# Patient Record
Sex: MALE | Race: WHITE | NOT HISPANIC OR LATINO
[De-identification: names, ages, dates, MRNs, and addresses within clinical notes are randomized per-mention and may not be internally consistent; named-entity substitution may affect disease eponyms.]

---

## 2017-07-05 ENCOUNTER — NON-APPOINTMENT (OUTPATIENT)
Age: 67
End: 2017-07-05

## 2017-07-05 ENCOUNTER — APPOINTMENT (OUTPATIENT)
Dept: CARDIOLOGY | Facility: CLINIC | Age: 67
End: 2017-07-05

## 2017-07-05 VITALS
DIASTOLIC BLOOD PRESSURE: 100 MMHG | BODY MASS INDEX: 31.42 KG/M2 | OXYGEN SATURATION: 95 % | HEIGHT: 76 IN | HEART RATE: 84 BPM | SYSTOLIC BLOOD PRESSURE: 170 MMHG | WEIGHT: 258 LBS

## 2017-07-05 DIAGNOSIS — L92.0 GRANULOMA ANNULARE: ICD-10-CM

## 2017-07-05 DIAGNOSIS — R63.4 ABNORMAL WEIGHT LOSS: ICD-10-CM

## 2017-07-05 DIAGNOSIS — H50.10 UNSPECIFIED EXOTROPIA: ICD-10-CM

## 2017-07-31 ENCOUNTER — APPOINTMENT (OUTPATIENT)
Dept: CARDIOLOGY | Facility: CLINIC | Age: 67
End: 2017-07-31
Payer: MEDICARE

## 2017-07-31 PROCEDURE — 93306 TTE W/DOPPLER COMPLETE: CPT

## 2017-08-09 ENCOUNTER — APPOINTMENT (OUTPATIENT)
Dept: CARDIOLOGY | Facility: CLINIC | Age: 67
End: 2017-08-09
Payer: MEDICARE

## 2017-08-09 PROCEDURE — 93015 CV STRESS TEST SUPVJ I&R: CPT

## 2017-08-10 ENCOUNTER — APPOINTMENT (OUTPATIENT)
Dept: CARDIOLOGY | Facility: CLINIC | Age: 67
End: 2017-08-10
Payer: MEDICARE

## 2017-08-10 PROCEDURE — 93880 EXTRACRANIAL BILAT STUDY: CPT

## 2017-08-16 ENCOUNTER — APPOINTMENT (OUTPATIENT)
Dept: CARDIOLOGY | Facility: CLINIC | Age: 67
End: 2017-08-16
Payer: MEDICARE

## 2017-08-16 ENCOUNTER — NON-APPOINTMENT (OUTPATIENT)
Age: 67
End: 2017-08-16

## 2017-08-16 VITALS
WEIGHT: 245 LBS | DIASTOLIC BLOOD PRESSURE: 98 MMHG | RESPIRATION RATE: 14 BRPM | TEMPERATURE: 98.2 F | HEART RATE: 40 BPM | BODY MASS INDEX: 29.83 KG/M2 | SYSTOLIC BLOOD PRESSURE: 169 MMHG | OXYGEN SATURATION: 100 % | HEIGHT: 76 IN

## 2017-08-16 PROCEDURE — 99214 OFFICE O/P EST MOD 30 MIN: CPT | Mod: 25

## 2017-08-16 PROCEDURE — 93000 ELECTROCARDIOGRAM COMPLETE: CPT

## 2017-08-22 ENCOUNTER — APPOINTMENT (OUTPATIENT)
Dept: CARDIOLOGY | Facility: CLINIC | Age: 67
End: 2017-08-22
Payer: MEDICARE

## 2017-08-22 PROCEDURE — 93224 XTRNL ECG REC UP TO 48 HRS: CPT

## 2017-08-25 ENCOUNTER — NON-APPOINTMENT (OUTPATIENT)
Age: 67
End: 2017-08-25

## 2017-09-27 ENCOUNTER — APPOINTMENT (OUTPATIENT)
Dept: CARDIOLOGY | Facility: CLINIC | Age: 67
End: 2017-09-27
Payer: MEDICARE

## 2017-09-27 ENCOUNTER — NON-APPOINTMENT (OUTPATIENT)
Age: 67
End: 2017-09-27

## 2017-09-27 VITALS
OXYGEN SATURATION: 96 % | DIASTOLIC BLOOD PRESSURE: 70 MMHG | HEIGHT: 76 IN | WEIGHT: 246 LBS | HEART RATE: 41 BPM | SYSTOLIC BLOOD PRESSURE: 136 MMHG | BODY MASS INDEX: 29.96 KG/M2

## 2017-09-27 PROCEDURE — 93000 ELECTROCARDIOGRAM COMPLETE: CPT

## 2017-09-27 PROCEDURE — 99214 OFFICE O/P EST MOD 30 MIN: CPT | Mod: 25

## 2017-10-12 ENCOUNTER — APPOINTMENT (OUTPATIENT)
Dept: CARDIOLOGY | Facility: CLINIC | Age: 67
End: 2017-10-12

## 2018-05-15 ENCOUNTER — NON-APPOINTMENT (OUTPATIENT)
Age: 68
End: 2018-05-15

## 2018-05-15 ENCOUNTER — APPOINTMENT (OUTPATIENT)
Dept: CARDIOLOGY | Facility: CLINIC | Age: 68
End: 2018-05-15
Payer: MEDICARE

## 2018-05-15 VITALS — BODY MASS INDEX: 31.42 KG/M2 | OXYGEN SATURATION: 99 % | HEIGHT: 76 IN | WEIGHT: 258 LBS | HEART RATE: 69 BPM

## 2018-05-15 DIAGNOSIS — I07.1 RHEUMATIC TRICUSPID INSUFFICIENCY: ICD-10-CM

## 2018-05-15 PROCEDURE — 93000 ELECTROCARDIOGRAM COMPLETE: CPT

## 2018-05-15 PROCEDURE — 99215 OFFICE O/P EST HI 40 MIN: CPT | Mod: 25

## 2018-05-17 LAB
25(OH)D3 SERPL-MCNC: 33.4 NG/ML
ALBUMIN SERPL ELPH-MCNC: 4.1 G/DL
ALP BLD-CCNC: 55 U/L
ALT SERPL-CCNC: 11 U/L
ANION GAP SERPL CALC-SCNC: 14 MMOL/L
AST SERPL-CCNC: 11 U/L
BASOPHILS # BLD AUTO: 0.02 K/UL
BASOPHILS NFR BLD AUTO: 0.3 %
BILIRUB SERPL-MCNC: 0.5 MG/DL
BUN SERPL-MCNC: 16 MG/DL
CALCIUM SERPL-MCNC: 10.2 MG/DL
CHLORIDE SERPL-SCNC: 106 MMOL/L
CHOLEST SERPL-MCNC: 202 MG/DL
CHOLEST/HDLC SERPL: 4.4 RATIO
CO2 SERPL-SCNC: 24 MMOL/L
CREAT SERPL-MCNC: 1.14 MG/DL
EOSINOPHIL # BLD AUTO: 0.12 K/UL
EOSINOPHIL NFR BLD AUTO: 1.7 %
GLUCOSE SERPL-MCNC: 92 MG/DL
HBA1C MFR BLD HPLC: 5.5 %
HCT VFR BLD CALC: 45.4 %
HDLC SERPL-MCNC: 46 MG/DL
HGB BLD-MCNC: 14.9 G/DL
IMM GRANULOCYTES NFR BLD AUTO: 0.4 %
LDLC SERPL CALC-MCNC: 123 MG/DL
LYMPHOCYTES # BLD AUTO: 1.77 K/UL
LYMPHOCYTES NFR BLD AUTO: 25.6 %
MAGNESIUM SERPL-MCNC: 2.2 MG/DL
MAN DIFF?: NORMAL
MCHC RBC-ENTMCNC: 29.7 PG
MCHC RBC-ENTMCNC: 32.8 GM/DL
MCV RBC AUTO: 90.4 FL
MONOCYTES # BLD AUTO: 0.64 K/UL
MONOCYTES NFR BLD AUTO: 9.3 %
NEUTROPHILS # BLD AUTO: 4.33 K/UL
NEUTROPHILS NFR BLD AUTO: 62.7 %
PLATELET # BLD AUTO: 377 K/UL
POTASSIUM SERPL-SCNC: 5.3 MMOL/L
PROT SERPL-MCNC: 6.8 G/DL
RBC # BLD: 5.02 M/UL
RBC # FLD: 13.2 %
SODIUM SERPL-SCNC: 144 MMOL/L
TRIGL SERPL-MCNC: 166 MG/DL
TSH SERPL-ACNC: 1.74 UIU/ML
WBC # FLD AUTO: 6.91 K/UL

## 2018-05-21 ENCOUNTER — MEDICATION RENEWAL (OUTPATIENT)
Age: 68
End: 2018-05-21

## 2018-05-21 ENCOUNTER — APPOINTMENT (OUTPATIENT)
Dept: CARDIOLOGY | Facility: CLINIC | Age: 68
End: 2018-05-21
Payer: MEDICARE

## 2018-05-21 ENCOUNTER — NON-APPOINTMENT (OUTPATIENT)
Age: 68
End: 2018-05-21

## 2018-05-21 VITALS
BODY MASS INDEX: 31.9 KG/M2 | SYSTOLIC BLOOD PRESSURE: 152 MMHG | OXYGEN SATURATION: 98 % | DIASTOLIC BLOOD PRESSURE: 94 MMHG | HEIGHT: 76 IN | WEIGHT: 262 LBS

## 2018-05-21 PROCEDURE — 99215 OFFICE O/P EST HI 40 MIN: CPT | Mod: 25

## 2018-05-21 PROCEDURE — 93000 ELECTROCARDIOGRAM COMPLETE: CPT

## 2018-06-14 ENCOUNTER — APPOINTMENT (OUTPATIENT)
Dept: CARDIOLOGY | Facility: CLINIC | Age: 68
End: 2018-06-14
Payer: MEDICARE

## 2018-06-14 PROCEDURE — 93306 TTE W/DOPPLER COMPLETE: CPT

## 2018-06-27 ENCOUNTER — APPOINTMENT (OUTPATIENT)
Dept: ELECTROPHYSIOLOGY | Facility: CLINIC | Age: 68
End: 2018-06-27
Payer: MEDICARE

## 2018-06-27 VITALS
WEIGHT: 264 LBS | DIASTOLIC BLOOD PRESSURE: 106 MMHG | HEART RATE: 40 BPM | HEIGHT: 77 IN | SYSTOLIC BLOOD PRESSURE: 179 MMHG | BODY MASS INDEX: 31.17 KG/M2

## 2018-06-27 PROCEDURE — 99205 OFFICE O/P NEW HI 60 MIN: CPT

## 2018-06-27 PROCEDURE — 93000 ELECTROCARDIOGRAM COMPLETE: CPT

## 2018-07-18 ENCOUNTER — MEDICATION RENEWAL (OUTPATIENT)
Age: 68
End: 2018-07-18

## 2018-07-31 ENCOUNTER — APPOINTMENT (OUTPATIENT)
Dept: ELECTROPHYSIOLOGY | Facility: CLINIC | Age: 68
End: 2018-07-31

## 2018-08-06 ENCOUNTER — MEDICATION RENEWAL (OUTPATIENT)
Age: 68
End: 2018-08-06

## 2018-08-27 ENCOUNTER — APPOINTMENT (OUTPATIENT)
Dept: ELECTROPHYSIOLOGY | Facility: CLINIC | Age: 68
End: 2018-08-27
Payer: MEDICARE

## 2018-08-27 PROCEDURE — 93228 REMOTE 30 DAY ECG REV/REPORT: CPT

## 2018-09-20 ENCOUNTER — APPOINTMENT (OUTPATIENT)
Dept: ELECTROPHYSIOLOGY | Facility: CLINIC | Age: 68
End: 2018-09-20

## 2018-10-18 ENCOUNTER — APPOINTMENT (OUTPATIENT)
Dept: ELECTROPHYSIOLOGY | Facility: CLINIC | Age: 68
End: 2018-10-18
Payer: MEDICARE

## 2018-10-18 VITALS
HEIGHT: 77 IN | SYSTOLIC BLOOD PRESSURE: 179 MMHG | WEIGHT: 264 LBS | BODY MASS INDEX: 31.17 KG/M2 | DIASTOLIC BLOOD PRESSURE: 126 MMHG

## 2018-10-18 PROCEDURE — 99214 OFFICE O/P EST MOD 30 MIN: CPT

## 2018-10-21 ENCOUNTER — RX RENEWAL (OUTPATIENT)
Age: 68
End: 2018-10-21

## 2019-04-14 ENCOUNTER — RX RENEWAL (OUTPATIENT)
Age: 69
End: 2019-04-14

## 2019-04-16 ENCOUNTER — RX RENEWAL (OUTPATIENT)
Age: 69
End: 2019-04-16

## 2019-05-06 ENCOUNTER — RX RENEWAL (OUTPATIENT)
Age: 69
End: 2019-05-06

## 2019-06-07 ENCOUNTER — NON-APPOINTMENT (OUTPATIENT)
Age: 69
End: 2019-06-07

## 2019-06-07 ENCOUNTER — APPOINTMENT (OUTPATIENT)
Dept: CARDIOLOGY | Facility: CLINIC | Age: 69
End: 2019-06-07
Payer: MEDICARE

## 2019-06-07 VITALS — HEIGHT: 77 IN | OXYGEN SATURATION: 98 % | WEIGHT: 261 LBS | BODY MASS INDEX: 30.82 KG/M2

## 2019-06-07 VITALS — DIASTOLIC BLOOD PRESSURE: 94 MMHG | SYSTOLIC BLOOD PRESSURE: 158 MMHG | HEART RATE: 37 BPM

## 2019-06-07 PROCEDURE — 99215 OFFICE O/P EST HI 40 MIN: CPT | Mod: 25

## 2019-06-07 PROCEDURE — 93000 ELECTROCARDIOGRAM COMPLETE: CPT

## 2019-06-07 NOTE — HISTORY OF PRESENT ILLNESS
[FreeTextEntry1] : A 68-year-old gentleman with  paroxysmal atrial fibrillation. Pt reports weight loss. He has no exertional symptoms. ECG today reveals sinus bradycardia in the 30s.\par Testing and labs were reviewed.

## 2019-06-07 NOTE — ASSESSMENT
[FreeTextEntry1] : 69 yo male with dilated aortic root, PAF.\par He will continue to lose weight, continue meds. He will have a repeat CT angio to eval aorta. Consult with EP for asymptomatic bradycardia. Dr. Bright was called.

## 2019-06-07 NOTE — REASON FOR VISIT
[Consultation] : a consultation regarding [Anticoagulation] : anticoagulation [Atrial Fibrillation] : atrial fibrillation [Sick Sinus Syndrome] : sick sinus syndrome

## 2019-06-07 NOTE — PHYSICAL EXAM
[General Appearance - Well Developed] : well developed [Normal Appearance] : normal appearance [Well Groomed] : well groomed [General Appearance - Well Nourished] : well nourished [No Deformities] : no deformities [General Appearance - In No Acute Distress] : no acute distress [Normal Conjunctiva] : the conjunctiva exhibited no abnormalities [Eyelids - No Xanthelasma] : the eyelids demonstrated no xanthelasmas [Normal Oral Mucosa] : normal oral mucosa [No Oral Pallor] : no oral pallor [Normal Jugular Venous A Waves Present] : normal jugular venous A waves present [No Oral Cyanosis] : no oral cyanosis [Normal Jugular Venous V Waves Present] : normal jugular venous V waves present [No Jugular Venous Smith A Waves] : no jugular venous smith A waves [Respiration, Rhythm And Depth] : normal respiratory rhythm and effort [Exaggerated Use Of Accessory Muscles For Inspiration] : no accessory muscle use [Auscultation Breath Sounds / Voice Sounds] : lungs were clear to auscultation bilaterally [Abdomen Soft] : soft [Abdomen Tenderness] : non-tender [Abdomen Mass (___ Cm)] : no abdominal mass palpated [Abnormal Walk] : normal gait [Gait - Sufficient For Exercise Testing] : the gait was sufficient for exercise testing [Nail Clubbing] : no clubbing of the fingernails [Petechial Hemorrhages (___cm)] : no petechial hemorrhages [Cyanosis, Localized] : no localized cyanosis [Skin Color & Pigmentation] : normal skin color and pigmentation [] : no rash [No Venous Stasis] : no venous stasis [Skin Lesions] : no skin lesions [No Skin Ulcers] : no skin ulcer [No Xanthoma] : no  xanthoma was observed [Oriented To Time, Place, And Person] : oriented to person, place, and time [Affect] : the affect was normal [Mood] : the mood was normal [No Anxiety] : not feeling anxious [5th Left ICS - MCL] : palpated at the 5th LICS in the midclavicular line [Normal] : normal [Bradycardia] : bradycardic [No Precordial Heave] : no precordial heave was noted [Rhythm Regular] : regular [Normal S1] : normal S1 [Normal S2] : normal S2 [No Murmur] : no murmurs heard [II] : a grade 2 [No Pitting Edema] : no pitting edema present

## 2019-06-21 RX ORDER — VALSARTAN 160 MG/1
160 TABLET, COATED ORAL DAILY
Qty: 30 | Refills: 0 | Status: DISCONTINUED | COMMUNITY
Start: 2017-08-16 | End: 2019-06-21

## 2019-06-24 ENCOUNTER — MEDICATION RENEWAL (OUTPATIENT)
Age: 69
End: 2019-06-24

## 2019-07-12 ENCOUNTER — APPOINTMENT (OUTPATIENT)
Dept: CARDIOLOGY | Facility: CLINIC | Age: 69
End: 2019-07-12
Payer: MEDICARE

## 2019-07-12 VITALS
DIASTOLIC BLOOD PRESSURE: 82 MMHG | BODY MASS INDEX: 30.34 KG/M2 | WEIGHT: 257 LBS | OXYGEN SATURATION: 97 % | HEART RATE: 45 BPM | HEIGHT: 77 IN | SYSTOLIC BLOOD PRESSURE: 127 MMHG

## 2019-07-12 PROCEDURE — 99214 OFFICE O/P EST MOD 30 MIN: CPT

## 2019-07-12 NOTE — PHYSICAL EXAM
[General Appearance - Well Developed] : well developed [Normal Appearance] : normal appearance [Well Groomed] : well groomed [General Appearance - Well Nourished] : well nourished [No Deformities] : no deformities [General Appearance - In No Acute Distress] : no acute distress [Normal Conjunctiva] : the conjunctiva exhibited no abnormalities [Eyelids - No Xanthelasma] : the eyelids demonstrated no xanthelasmas [Normal Oral Mucosa] : normal oral mucosa [No Oral Pallor] : no oral pallor [No Oral Cyanosis] : no oral cyanosis [Normal Jugular Venous A Waves Present] : normal jugular venous A waves present [Normal Jugular Venous V Waves Present] : normal jugular venous V waves present [No Jugular Venous Smith A Waves] : no jugular venous smith A waves [Respiration, Rhythm And Depth] : normal respiratory rhythm and effort [Exaggerated Use Of Accessory Muscles For Inspiration] : no accessory muscle use [Auscultation Breath Sounds / Voice Sounds] : lungs were clear to auscultation bilaterally [Abdomen Soft] : soft [Abdomen Tenderness] : non-tender [Abdomen Mass (___ Cm)] : no abdominal mass palpated [Abnormal Walk] : normal gait [Gait - Sufficient For Exercise Testing] : the gait was sufficient for exercise testing [Nail Clubbing] : no clubbing of the fingernails [Cyanosis, Localized] : no localized cyanosis [Petechial Hemorrhages (___cm)] : no petechial hemorrhages [Skin Color & Pigmentation] : normal skin color and pigmentation [] : no rash [No Venous Stasis] : no venous stasis [Skin Lesions] : no skin lesions [No Xanthoma] : no  xanthoma was observed [No Skin Ulcers] : no skin ulcer [Oriented To Time, Place, And Person] : oriented to person, place, and time [Affect] : the affect was normal [No Anxiety] : not feeling anxious [Mood] : the mood was normal [5th Left ICS - MCL] : palpated at the 5th LICS in the midclavicular line [Normal] : normal [No Precordial Heave] : no precordial heave was noted [Bradycardia] : bradycardic [Rhythm Regular] : regular [Normal S1] : normal S1 [Normal S2] : normal S2 [No Murmur] : no murmurs heard [II] : a grade 2 [No Pitting Edema] : no pitting edema present

## 2019-07-12 NOTE — DISCUSSION/SUMMARY
[FreeTextEntry1] : HTN; Currently stable, continue current medication regimen.\par AO dilation: Recent CT results reviewed. \par Afib : rate controlled, continue eliquis.\par OV in 2 months \par

## 2019-07-12 NOTE — HISTORY OF PRESENT ILLNESS
[FreeTextEntry1] : A 68-year-old gentleman with  paroxysmal atrial fibrillation, ascending aorta dilatation  presents to office as blood pressure medication was recently changed from valsartan to telmisartan. blood pressure currently stable.\par \par Denies chest pain, shortness of breath, dyspnea on exertion, palpitations, orthopnea, paroxysmal nocturnal dyspnea, claudication, dizziness, lightheadedness, presyncopal or syncopal symptoms. \par

## 2019-07-17 ENCOUNTER — RX RENEWAL (OUTPATIENT)
Age: 69
End: 2019-07-17

## 2019-07-18 ENCOUNTER — MEDICATION RENEWAL (OUTPATIENT)
Age: 69
End: 2019-07-18

## 2019-08-12 ENCOUNTER — NON-APPOINTMENT (OUTPATIENT)
Age: 69
End: 2019-08-12

## 2019-08-12 ENCOUNTER — APPOINTMENT (OUTPATIENT)
Dept: CARDIOLOGY | Facility: CLINIC | Age: 69
End: 2019-08-12
Payer: MEDICARE

## 2019-08-12 VITALS — OXYGEN SATURATION: 100 % | HEART RATE: 44 BPM | SYSTOLIC BLOOD PRESSURE: 166 MMHG | DIASTOLIC BLOOD PRESSURE: 80 MMHG

## 2019-08-12 VITALS — BODY MASS INDEX: 31.05 KG/M2 | WEIGHT: 263 LBS | HEIGHT: 77 IN

## 2019-08-12 PROCEDURE — 93000 ELECTROCARDIOGRAM COMPLETE: CPT

## 2019-08-12 PROCEDURE — 99214 OFFICE O/P EST MOD 30 MIN: CPT | Mod: 25

## 2019-08-12 NOTE — HISTORY OF PRESENT ILLNESS
[FreeTextEntry1] : A 69-year-old gentleman with  paroxysmal atrial fibrillation, ascending aorta dilatation  presents to office as blood pressure medication was recently changed from valsartan to telmisartan. blood pressure currently stable.\par \par Pt is tolerating the medications. ECG was noted to be bradycardic. This is similar to prior tracings and the patient denies symptoms of dizziness, fatigue or near syncope.

## 2019-08-12 NOTE — DISCUSSION/SUMMARY
[FreeTextEntry1] : HTN; Currently stable, continue current medication regimen.\par Continue anticoagulation\par

## 2019-08-12 NOTE — PHYSICAL EXAM
[General Appearance - Well Developed] : well developed [General Appearance - Well Nourished] : well nourished [Normal Appearance] : normal appearance [Well Groomed] : well groomed [General Appearance - In No Acute Distress] : no acute distress [No Deformities] : no deformities [Normal Conjunctiva] : the conjunctiva exhibited no abnormalities [Normal Oral Mucosa] : normal oral mucosa [Eyelids - No Xanthelasma] : the eyelids demonstrated no xanthelasmas [No Oral Pallor] : no oral pallor [No Oral Cyanosis] : no oral cyanosis [Normal Jugular Venous A Waves Present] : normal jugular venous A waves present [Normal Jugular Venous V Waves Present] : normal jugular venous V waves present [No Jugular Venous Smith A Waves] : no jugular venous smith A waves [Respiration, Rhythm And Depth] : normal respiratory rhythm and effort [Auscultation Breath Sounds / Voice Sounds] : lungs were clear to auscultation bilaterally [Abdomen Soft] : soft [Exaggerated Use Of Accessory Muscles For Inspiration] : no accessory muscle use [Abdomen Tenderness] : non-tender [Abdomen Mass (___ Cm)] : no abdominal mass palpated [Abnormal Walk] : normal gait [Gait - Sufficient For Exercise Testing] : the gait was sufficient for exercise testing [Cyanosis, Localized] : no localized cyanosis [Nail Clubbing] : no clubbing of the fingernails [Petechial Hemorrhages (___cm)] : no petechial hemorrhages [Skin Color & Pigmentation] : normal skin color and pigmentation [] : no rash [No Venous Stasis] : no venous stasis [Skin Lesions] : no skin lesions [No Skin Ulcers] : no skin ulcer [No Xanthoma] : no  xanthoma was observed [Oriented To Time, Place, And Person] : oriented to person, place, and time [Affect] : the affect was normal [Mood] : the mood was normal [No Anxiety] : not feeling anxious [5th Left ICS - MCL] : palpated at the 5th LICS in the midclavicular line [Normal] : normal [No Precordial Heave] : no precordial heave was noted [Bradycardia] : bradycardic [Rhythm Regular] : regular [Normal S1] : normal S1 [No Murmur] : no murmurs heard [Normal S2] : normal S2 [II] : a grade 2 [No Pitting Edema] : no pitting edema present

## 2019-09-11 ENCOUNTER — RX RENEWAL (OUTPATIENT)
Age: 69
End: 2019-09-11

## 2019-09-11 ENCOUNTER — MEDICATION RENEWAL (OUTPATIENT)
Age: 69
End: 2019-09-11

## 2020-09-10 ENCOUNTER — RX RENEWAL (OUTPATIENT)
Age: 70
End: 2020-09-10

## 2020-10-05 ENCOUNTER — NON-APPOINTMENT (OUTPATIENT)
Age: 70
End: 2020-10-05

## 2020-10-05 ENCOUNTER — APPOINTMENT (OUTPATIENT)
Dept: CARDIOLOGY | Facility: CLINIC | Age: 70
End: 2020-10-05
Payer: MEDICARE

## 2020-10-05 VITALS
OXYGEN SATURATION: 99 % | WEIGHT: 268 LBS | HEART RATE: 74 BPM | SYSTOLIC BLOOD PRESSURE: 163 MMHG | HEIGHT: 77 IN | DIASTOLIC BLOOD PRESSURE: 90 MMHG | BODY MASS INDEX: 31.64 KG/M2

## 2020-10-05 DIAGNOSIS — N28.1 CYST OF KIDNEY, ACQUIRED: ICD-10-CM

## 2020-10-05 PROCEDURE — 99214 OFFICE O/P EST MOD 30 MIN: CPT

## 2020-10-05 PROCEDURE — 93000 ELECTROCARDIOGRAM COMPLETE: CPT

## 2020-10-05 NOTE — PHYSICAL EXAM
[General Appearance - Well Developed] : well developed [Normal Appearance] : normal appearance [Well Groomed] : well groomed [General Appearance - Well Nourished] : well nourished [No Deformities] : no deformities [General Appearance - In No Acute Distress] : no acute distress [Normal Conjunctiva] : the conjunctiva exhibited no abnormalities [Eyelids - No Xanthelasma] : the eyelids demonstrated no xanthelasmas [Normal Oral Mucosa] : normal oral mucosa [No Oral Pallor] : no oral pallor [No Oral Cyanosis] : no oral cyanosis [Normal Jugular Venous A Waves Present] : normal jugular venous A waves present [Normal Jugular Venous V Waves Present] : normal jugular venous V waves present [No Jugular Venous Smith A Waves] : no jugular venous smith A waves [Respiration, Rhythm And Depth] : normal respiratory rhythm and effort [Exaggerated Use Of Accessory Muscles For Inspiration] : no accessory muscle use [Auscultation Breath Sounds / Voice Sounds] : lungs were clear to auscultation bilaterally [Abdomen Soft] : soft [Abdomen Tenderness] : non-tender [Abdomen Mass (___ Cm)] : no abdominal mass palpated [Abnormal Walk] : normal gait [Gait - Sufficient For Exercise Testing] : the gait was sufficient for exercise testing [Nail Clubbing] : no clubbing of the fingernails [Cyanosis, Localized] : no localized cyanosis [Petechial Hemorrhages (___cm)] : no petechial hemorrhages [Skin Color & Pigmentation] : normal skin color and pigmentation [] : no rash [No Venous Stasis] : no venous stasis [Skin Lesions] : no skin lesions [No Skin Ulcers] : no skin ulcer [No Xanthoma] : no  xanthoma was observed [Oriented To Time, Place, And Person] : oriented to person, place, and time [Affect] : the affect was normal [Mood] : the mood was normal [No Anxiety] : not feeling anxious [5th Left ICS - MCL] : palpated at the 5th LICS in the midclavicular line [Normal] : normal [No Precordial Heave] : no precordial heave was noted [Bradycardia] : bradycardic [Rhythm Regular] : regular [Normal S1] : normal S1 [Normal S2] : normal S2 [No Murmur] : no murmurs heard [II] : a grade 2 [No Pitting Edema] : no pitting edema present

## 2020-10-05 NOTE — DISCUSSION/SUMMARY
[FreeTextEntry1] : Pt will continue B/P measurements at home for correlation. Pt's wife (an RN) will monitor. Pt will return in 3 months for evaluation. Will repeat Echo to evaluate Pulmonary HTN.

## 2020-10-05 NOTE — HISTORY OF PRESENT ILLNESS
[FreeTextEntry1] : A 70-year-old gentleman with  paroxysmal atrial fibrillation, ascending aorta dilatation. Pt has elevated blood pressure but he and his wife report lower blood pressure at home. \par \par Pt is tolerating the medications. ECG was reviewed. Pt denies chest pain or shortness of breath.

## 2020-10-26 ENCOUNTER — APPOINTMENT (OUTPATIENT)
Dept: CARDIOLOGY | Facility: CLINIC | Age: 70
End: 2020-10-26

## 2020-11-02 PROBLEM — N28.1 RENAL CYST: Status: ACTIVE | Noted: 2017-11-03

## 2020-11-07 ENCOUNTER — APPOINTMENT (OUTPATIENT)
Dept: CARDIOLOGY | Facility: CLINIC | Age: 70
End: 2020-11-07
Payer: MEDICARE

## 2020-11-07 PROCEDURE — 93306 TTE W/DOPPLER COMPLETE: CPT

## 2020-11-10 ENCOUNTER — APPOINTMENT (OUTPATIENT)
Dept: UROLOGY | Facility: CLINIC | Age: 70
End: 2020-11-10
Payer: MEDICARE

## 2020-11-10 VITALS
WEIGHT: 255 LBS | BODY MASS INDEX: 32.73 KG/M2 | DIASTOLIC BLOOD PRESSURE: 97 MMHG | HEIGHT: 74 IN | OXYGEN SATURATION: 98 % | TEMPERATURE: 97.7 F | HEART RATE: 70 BPM | SYSTOLIC BLOOD PRESSURE: 127 MMHG

## 2020-11-10 DIAGNOSIS — Z86.79 PERSONAL HISTORY OF OTHER DISEASES OF THE CIRCULATORY SYSTEM: ICD-10-CM

## 2020-11-10 PROCEDURE — 99213 OFFICE O/P EST LOW 20 MIN: CPT

## 2020-11-10 NOTE — REVIEW OF SYSTEMS
[Constipation] : constipation [see HPI] : see HPI [Wake up at night to urinate  How many times?  ___] : wakes up to urinate [unfilled] times during the night [Slow urine stream] : slow urine stream [Negative] : Heme/Lymph

## 2020-11-11 NOTE — HISTORY OF PRESENT ILLNESS
[FreeTextEntry1] : 70 year old man seen 11/10/2020  with complaint of elevated PSA. This began on 10/2/20 where it was found to be 4.99 and the PSA was 3.67 in June 2019. He denies any lower urinary tract symptoms.\par It is not associated with LUTS.\par \par No hematuria, no dysuria, no frequency, no urgency, no hesitancy, no straining. No incontinence. \par No fevers, no chills, no nausea, no vomiting, no flank pain.\par

## 2020-11-11 NOTE — ASSESSMENT
[FreeTextEntry1] : 71 yo male with elevated PSA of 4.99 on 10/2/20. Discussed with patient that PSA is imprecise test. PSA can be elevated due to benign prostate enlargement, prostate stimulation, sexual activity, urinary tract infection, prostatitis, as well as prostate cancer. There is no value for PSA which is diagnostic for prostate cancer, nor is there value which conclusively excludes prostate cancer. PSA simply stratifies risk for prostate cancer and diagnosis of prostate cancer requires prostate biopsy. Patient would like to repeat PSA in 3 months and based on the results decide if he would like to proceed with a biopsy.\par - PSA and 4K score in 3 months\par - RTO in 3 months to discuss results\par

## 2020-12-28 ENCOUNTER — RX RENEWAL (OUTPATIENT)
Age: 70
End: 2020-12-28

## 2021-05-18 ENCOUNTER — NON-APPOINTMENT (OUTPATIENT)
Age: 71
End: 2021-05-18

## 2021-05-18 ENCOUNTER — APPOINTMENT (OUTPATIENT)
Dept: FAMILY MEDICINE | Facility: CLINIC | Age: 71
End: 2021-05-18
Payer: MEDICARE

## 2021-05-18 VITALS
SYSTOLIC BLOOD PRESSURE: 140 MMHG | BODY MASS INDEX: 32.73 KG/M2 | TEMPERATURE: 96.3 F | HEART RATE: 75 BPM | DIASTOLIC BLOOD PRESSURE: 88 MMHG | OXYGEN SATURATION: 96 % | WEIGHT: 255 LBS | HEIGHT: 74 IN

## 2021-05-18 VITALS — DIASTOLIC BLOOD PRESSURE: 82 MMHG | SYSTOLIC BLOOD PRESSURE: 130 MMHG

## 2021-05-18 DIAGNOSIS — F17.290 NICOTINE DEPENDENCE, OTHER TOBACCO PRODUCT, UNCOMPLICATED: ICD-10-CM

## 2021-05-18 DIAGNOSIS — Z87.898 PERSONAL HISTORY OF OTHER SPECIFIED CONDITIONS: ICD-10-CM

## 2021-05-18 DIAGNOSIS — R73.01 IMPAIRED FASTING GLUCOSE: ICD-10-CM

## 2021-05-18 DIAGNOSIS — Z23 ENCOUNTER FOR IMMUNIZATION: ICD-10-CM

## 2021-05-18 PROCEDURE — 90750 HZV VACC RECOMBINANT IM: CPT | Mod: GY

## 2021-05-18 PROCEDURE — 99214 OFFICE O/P EST MOD 30 MIN: CPT | Mod: 25

## 2021-05-18 PROCEDURE — 90471 IMMUNIZATION ADMIN: CPT

## 2021-05-18 PROCEDURE — 36415 COLL VENOUS BLD VENIPUNCTURE: CPT

## 2021-05-18 RX ORDER — MIRTAZAPINE 15 MG/1
15 TABLET, FILM COATED ORAL
Qty: 30 | Refills: 0 | Status: DISCONTINUED | COMMUNITY
Start: 2020-08-10 | End: 2021-05-18

## 2021-05-18 RX ORDER — VALSARTAN 160 MG/1
160 TABLET, COATED ORAL DAILY
Refills: 0 | Status: DISCONTINUED | COMMUNITY
End: 2021-05-18

## 2021-05-18 RX ORDER — LAMOTRIGINE 25 MG/1
25 TABLET ORAL
Qty: 120 | Refills: 0 | Status: DISCONTINUED | COMMUNITY
Start: 2020-06-25 | End: 2021-05-18

## 2021-05-18 NOTE — REVIEW OF SYSTEMS
[Fatigue] : fatigue [Constipation] : constipation [Frequency] : frequency [Poor Libido] : poor libido [Chest Pain] : no chest pain [Palpitations] : no palpitations [Shortness Of Breath] : no shortness of breath [Cough] : no cough [Dyspnea on Exertion] : not dyspnea on exertion [Abdominal Pain] : no abdominal pain [Nausea] : no nausea [Diarrhea] : no diarrhea [Vomiting] : no vomiting [Dysuria] : no dysuria [Hematuria] : no hematuria [Joint Pain] : no joint pain [Back Pain] : no back pain [Skin Rash] : no skin rash [Headache] : no headache [Dizziness] : no dizziness [Memory Loss] : no memory loss [Anxiety] : no anxiety [Depression] : no depression [FreeTextEntry3] : exotropia [de-identified] : history of absence seizures

## 2021-05-18 NOTE — ASSESSMENT
[FreeTextEntry1] : His main complaint is chronic fatigue. His wife feels that this is due in part to Keppra and plans to talk to his neurologist about other treatment options for his seizures.

## 2021-05-18 NOTE — PLAN
[FreeTextEntry1] : Check labs as above. He is due for a follow up visit with Dr. Hendricks and Dr. Lorenzana. Will make sure that these labs are shared with both of them. He will continue all of his current medications at this time but will discuss other possible treatment options for his seizures with neurology.

## 2021-05-18 NOTE — PHYSICAL EXAM
[No Carotid Bruits] : no carotid bruits [No Edema] : there was no peripheral edema [Soft] : abdomen soft [Non Tender] : non-tender [Grossly Normal Strength/Tone] : grossly normal strength/tone [No Rash] : no rash [No Focal Deficits] : no focal deficits [Normal] : affect was normal and insight and judgment were intact [de-identified] : right exotropia

## 2021-05-18 NOTE — HISTORY OF PRESENT ILLNESS
[Spouse] : spouse [FreeTextEntry1] : RC MCCAINLBAUM is a 70 year old male here for a follow up visit.\par  [de-identified] : He is here for blood work to follow up his cholesterol. Dr. Hendricks requested blood work prior to his visit. He has also had an elevated PSA and has seen Dr. Lorenzana for this. His last visit with both Dr. Hendricks and Dr. Lorenzana was in Fall 2020.\par \par He reports fatigue and leg weakness. His wife feels that his is due to his medication (Keppra). He also has irregular eating habits and she feels that he gets hypoglycemic from eating too many carbohydrates.\par \par He would like to get the Shingrix vaccine today. He had the COVID vaccine and his second dose was in March.

## 2021-05-22 LAB
25(OH)D3 SERPL-MCNC: 38.2 NG/ML
ALBUMIN SERPL ELPH-MCNC: 4.4 G/DL
ALP BLD-CCNC: 67 U/L
ALT SERPL-CCNC: 6 U/L
ANION GAP SERPL CALC-SCNC: 13 MMOL/L
AST SERPL-CCNC: 9 U/L
BASOPHILS # BLD AUTO: 0.05 K/UL
BASOPHILS NFR BLD AUTO: 0.8 %
BILIRUB SERPL-MCNC: 0.8 MG/DL
BUN SERPL-MCNC: 16 MG/DL
CALCIUM SERPL-MCNC: 9.7 MG/DL
CHLORIDE SERPL-SCNC: 106 MMOL/L
CHOLEST SERPL-MCNC: 197 MG/DL
CK SERPL-CCNC: 74 U/L
CO2 SERPL-SCNC: 22 MMOL/L
CREAT SERPL-MCNC: 1.05 MG/DL
EOSINOPHIL # BLD AUTO: 0.18 K/UL
EOSINOPHIL NFR BLD AUTO: 2.8 %
GLUCOSE SERPL-MCNC: 85 MG/DL
HCT VFR BLD CALC: 45.5 %
HDLC SERPL-MCNC: 39 MG/DL
HGB BLD-MCNC: 14.8 G/DL
IMM GRANULOCYTES NFR BLD AUTO: 0.3 %
LDLC SERPL CALC-MCNC: 141 MG/DL
LYMPHOCYTES # BLD AUTO: 1.51 K/UL
LYMPHOCYTES NFR BLD AUTO: 23.7 %
MAN DIFF?: NORMAL
MCHC RBC-ENTMCNC: 30.4 PG
MCHC RBC-ENTMCNC: 32.5 GM/DL
MCV RBC AUTO: 93.4 FL
MONOCYTES # BLD AUTO: 0.57 K/UL
MONOCYTES NFR BLD AUTO: 8.9 %
NEUTROPHILS # BLD AUTO: 4.05 K/UL
NEUTROPHILS NFR BLD AUTO: 63.5 %
NONHDLC SERPL-MCNC: 158 MG/DL
PLATELET # BLD AUTO: 287 K/UL
POTASSIUM SERPL-SCNC: 4.2 MMOL/L
PROT SERPL-MCNC: 6.6 G/DL
PSA SERPL-MCNC: 5.21 NG/ML
RBC # BLD: 4.87 M/UL
RBC # FLD: 13.7 %
SODIUM SERPL-SCNC: 141 MMOL/L
TRIGL SERPL-MCNC: 82 MG/DL
TSH SERPL-ACNC: 1.9 UIU/ML
WBC # FLD AUTO: 6.38 K/UL

## 2021-06-01 DIAGNOSIS — M79.603 PAIN IN ARM, UNSPECIFIED: ICD-10-CM

## 2021-06-02 RX ORDER — NAPROXEN 500 MG/1
500 TABLET ORAL
Qty: 60 | Refills: 0 | Status: DISCONTINUED | COMMUNITY
Start: 2021-06-01 | End: 2021-06-02

## 2021-06-10 ENCOUNTER — APPOINTMENT (OUTPATIENT)
Dept: UROLOGY | Facility: CLINIC | Age: 71
End: 2021-06-10
Payer: MEDICARE

## 2021-06-10 VITALS
DIASTOLIC BLOOD PRESSURE: 84 MMHG | HEART RATE: 74 BPM | OXYGEN SATURATION: 98 % | TEMPERATURE: 97.2 F | SYSTOLIC BLOOD PRESSURE: 123 MMHG

## 2021-06-10 DIAGNOSIS — R97.20 ELEVATED PROSTATE, SPECIFIC ANTIGEN [PSA]: ICD-10-CM

## 2021-06-10 PROCEDURE — 99213 OFFICE O/P EST LOW 20 MIN: CPT

## 2021-06-16 ENCOUNTER — APPOINTMENT (OUTPATIENT)
Dept: FAMILY MEDICINE | Facility: CLINIC | Age: 71
End: 2021-06-16

## 2021-06-24 ENCOUNTER — APPOINTMENT (OUTPATIENT)
Dept: MRI IMAGING | Facility: CLINIC | Age: 71
End: 2021-06-24
Payer: MEDICARE

## 2021-06-24 ENCOUNTER — OUTPATIENT (OUTPATIENT)
Dept: OUTPATIENT SERVICES | Facility: HOSPITAL | Age: 71
LOS: 1 days | End: 2021-06-24
Payer: MEDICARE

## 2021-06-24 ENCOUNTER — RESULT REVIEW (OUTPATIENT)
Age: 71
End: 2021-06-24

## 2021-06-24 DIAGNOSIS — Z00.8 ENCOUNTER FOR OTHER GENERAL EXAMINATION: ICD-10-CM

## 2021-06-24 PROCEDURE — 76377 3D RENDER W/INTRP POSTPROCES: CPT

## 2021-06-24 PROCEDURE — A9585: CPT

## 2021-06-24 PROCEDURE — 72197 MRI PELVIS W/O & W/DYE: CPT

## 2021-06-24 PROCEDURE — 72197 MRI PELVIS W/O & W/DYE: CPT | Mod: 26,MH

## 2021-06-24 PROCEDURE — 76377 3D RENDER W/INTRP POSTPROCES: CPT | Mod: 26

## 2021-06-26 ENCOUNTER — RX RENEWAL (OUTPATIENT)
Age: 71
End: 2021-06-26

## 2021-06-28 ENCOUNTER — APPOINTMENT (OUTPATIENT)
Dept: CARDIOLOGY | Facility: CLINIC | Age: 71
End: 2021-06-28
Payer: MEDICARE

## 2021-06-28 ENCOUNTER — NON-APPOINTMENT (OUTPATIENT)
Age: 71
End: 2021-06-28

## 2021-06-28 VITALS
HEIGHT: 74 IN | WEIGHT: 255 LBS | HEART RATE: 77 BPM | DIASTOLIC BLOOD PRESSURE: 90 MMHG | SYSTOLIC BLOOD PRESSURE: 130 MMHG | BODY MASS INDEX: 32.73 KG/M2 | OXYGEN SATURATION: 96 %

## 2021-06-28 DIAGNOSIS — R00.1 BRADYCARDIA, UNSPECIFIED: ICD-10-CM

## 2021-06-28 PROCEDURE — 93000 ELECTROCARDIOGRAM COMPLETE: CPT

## 2021-06-28 PROCEDURE — 99214 OFFICE O/P EST MOD 30 MIN: CPT

## 2021-06-28 NOTE — DISCUSSION/SUMMARY
[FreeTextEntry1] : Pt will continue B/P measurements at home for correlation. Pt's wife (an RN) will monitor. Pt will return in 3 months for evaluation. Will repeat Echo to evaluate Pulmonary HTN. Pt will start Crestor 10 mg daily. Will repeat lipids and LFTs in 9 weeks.

## 2021-06-28 NOTE — REASON FOR VISIT
[Arrhythmia/ECG Abnorrmalities] : arrhythmia/ECG abnormalities [Consultation] : a consultation regarding [Anticoagulation] : anticoagulation [Atrial Fibrillation] : atrial fibrillation [Sick Sinus Syndrome] : sick sinus syndrome

## 2021-06-28 NOTE — HISTORY OF PRESENT ILLNESS
[FreeTextEntry1] : A 70-year-old gentleman with  paroxysmal atrial fibrillation, ascending aorta dilatation. Blood pressure improved today. Recent notes from PMD and URO were reviewed. Pt denies exertional symptoms. Pt has reduced Keppra dose and feels improved. Labs were reviewed.

## 2021-06-30 ENCOUNTER — NON-APPOINTMENT (OUTPATIENT)
Age: 71
End: 2021-06-30

## 2021-07-01 ENCOUNTER — APPOINTMENT (OUTPATIENT)
Dept: UROLOGY | Facility: CLINIC | Age: 71
End: 2021-07-01
Payer: MEDICARE

## 2021-07-01 VITALS
SYSTOLIC BLOOD PRESSURE: 183 MMHG | WEIGHT: 165 LBS | OXYGEN SATURATION: 96 % | BODY MASS INDEX: 23.1 KG/M2 | HEART RATE: 57 BPM | DIASTOLIC BLOOD PRESSURE: 89 MMHG | HEIGHT: 71 IN

## 2021-07-01 PROCEDURE — 99213 OFFICE O/P EST LOW 20 MIN: CPT

## 2021-07-01 NOTE — ASSESSMENT
[FreeTextEntry1] : 69 yo male with elevated PSA and PIRADS 5 lesion. Recommend fusion biopsy. Will set up with Dr Jaime.

## 2021-07-01 NOTE — HISTORY OF PRESENT ILLNESS
[FreeTextEntry1] : 70 year old man seen 11/10/2020  with complaint of elevated PSA. This began on 10/2/20 where it was found to be 4.99 and the PSA was 3.67 in June 2019. He denies any lower urinary tract symptoms.\par It is not associated with LUTS.\par \par No hematuria, no dysuria, no frequency, no urgency, no hesitancy, no straining. No incontinence. \par No fevers, no chills, no nausea, no vomiting, no flank pain.\par \par 07/01/2021: Patient presents for follow up. MRI showed PIRADS 5 lesion in his prostate. He denies new  symptoms or complaints.

## 2021-07-06 ENCOUNTER — NON-APPOINTMENT (OUTPATIENT)
Age: 71
End: 2021-07-06

## 2021-07-07 ENCOUNTER — NON-APPOINTMENT (OUTPATIENT)
Age: 71
End: 2021-07-07

## 2021-07-11 PROBLEM — R97.20 ELEVATED PSA: Status: ACTIVE | Noted: 2020-11-10

## 2021-07-11 NOTE — HISTORY OF PRESENT ILLNESS
[FreeTextEntry1] : 70 year old man seen 11/10/2020  with complaint of elevated PSA. This began on 10/2/20 where it was found to be 4.99 and the PSA was 3.67 in June 2019. He denies any lower urinary tract symptoms.\par It is not associated with LUTS.  No hematuria, no dysuria, no frequency, no urgency, no hesitancy, no straining. No incontinence. No fevers, no chills, no nausea, no vomiting, no flank pain.\par \par \par 06/10/2021: Patient presents for follow up. He repeated his PSA and is here to discuss. No new  complaints.\par \par PSA (%FREE) TREND \par 5.21   06/2021\par 4.99   10/2020\par 3.67   06/2019

## 2021-07-11 NOTE — ASSESSMENT
[FreeTextEntry1] : 69 yo male with elevated PSA of 5.21 from 4.99 on 10/2/20. Discussed with patient that PSA is imprecise test. PSA can be elevated due to benign prostate enlargement, prostate stimulation, sexual activity, urinary tract infection, prostatitis, as well as prostate cancer. There is no value for PSA which is diagnostic for prostate cancer, nor is there value which conclusively excludes prostate cancer. PSA simply stratifies risk for prostate cancer and diagnosis of prostate cancer requires prostate biopsy. Recommended prostate MRI\par \par Elevated PSA\par - prostate MRI\par

## 2021-07-19 ENCOUNTER — NON-APPOINTMENT (OUTPATIENT)
Age: 71
End: 2021-07-19

## 2021-07-20 ENCOUNTER — NON-APPOINTMENT (OUTPATIENT)
Age: 71
End: 2021-07-20

## 2021-07-27 ENCOUNTER — NON-APPOINTMENT (OUTPATIENT)
Age: 71
End: 2021-07-27

## 2021-07-28 ENCOUNTER — APPOINTMENT (OUTPATIENT)
Dept: UROLOGY | Facility: CLINIC | Age: 71
End: 2021-07-28
Payer: MEDICARE

## 2021-07-28 PROCEDURE — 76377 3D RENDER W/INTRP POSTPROCES: CPT

## 2021-07-28 PROCEDURE — 76942 ECHO GUIDE FOR BIOPSY: CPT | Mod: 59

## 2021-07-28 PROCEDURE — 76872 US TRANSRECTAL: CPT

## 2021-07-28 PROCEDURE — 55700: CPT | Mod: 22

## 2021-08-04 LAB — CORE LAB BIOPSY: NORMAL

## 2021-08-09 ENCOUNTER — APPOINTMENT (OUTPATIENT)
Dept: UROLOGY | Facility: CLINIC | Age: 71
End: 2021-08-09

## 2021-08-10 ENCOUNTER — APPOINTMENT (OUTPATIENT)
Dept: UROLOGY | Facility: CLINIC | Age: 71
End: 2021-08-10
Payer: MEDICARE

## 2021-08-10 ENCOUNTER — RESULT REVIEW (OUTPATIENT)
Age: 71
End: 2021-08-10

## 2021-08-10 PROCEDURE — 99213 OFFICE O/P EST LOW 20 MIN: CPT

## 2021-08-10 NOTE — HISTORY OF PRESENT ILLNESS
[FreeTextEntry1] : 70 year old man seen 11/10/2020  with complaint of elevated PSA. This began on 10/2/20 where it was found to be 4.99 and the PSA was 3.67 in June 2019. He denies any lower urinary tract symptoms.\par It is not associated with LUTS.\par \par No hematuria, no dysuria, no frequency, no urgency, no hesitancy, no straining. No incontinence. \par No fevers, no chills, no nausea, no vomiting, no flank pain.\par \par 07/01/2021: Patient presents for follow up. MRI showed PIRADS 5 lesion in his prostate. He denies new  symptoms or complaints.\par \par 08/10/2021: Patient presents for follow up. He is s/p transperineal fusion biopsy. This showed GS 3+4=7 in 2 cores and GS 3+3=6 in 2 cores.

## 2021-08-10 NOTE — ASSESSMENT
[FreeTextEntry1] : 72 yo male with cT1c, PSA 5.21, GS 3+4=7 prostate cancer. We had an extensive discussion about the treatment options.  We discussed robot-assisted radical prostatectomy, with benefits of resection of entire prostate gland and subsequent pathologic examination, removal of lymph nodes for prognostic and therapeutic purposes, decreased complications developing anew years from now relative to radiation therapy. It has the risks associated with anesthesia, bleeding, infection, lymphocele, erectile dysfunction, urinary incontinence, and injury to adjacent organs including the rectum and bladder. We discussed radiation therapy, including EBRT and brachytherapy, with the benefits of noninvasiveness, no anesthesia, no hospital stay, and minimal recovery compared to surgery. The risks including delayed complications of radiation proctitis and radiation cystitis were discussed. It was stressed that both modalities have comparable cancer control and disease specific survival. Patient was given recommendations of urologic oncologist and radiation oncologist to discuss these modalities further.

## 2021-08-16 ENCOUNTER — OUTPATIENT (OUTPATIENT)
Dept: OUTPATIENT SERVICES | Facility: HOSPITAL | Age: 71
LOS: 1 days | End: 2021-08-16
Payer: MEDICARE

## 2021-08-16 ENCOUNTER — APPOINTMENT (OUTPATIENT)
Dept: CT IMAGING | Facility: CLINIC | Age: 71
End: 2021-08-16
Payer: MEDICARE

## 2021-08-16 DIAGNOSIS — C61 MALIGNANT NEOPLASM OF PROSTATE: ICD-10-CM

## 2021-08-16 PROCEDURE — 82565 ASSAY OF CREATININE: CPT

## 2021-08-16 PROCEDURE — 74177 CT ABD & PELVIS W/CONTRAST: CPT

## 2021-08-16 PROCEDURE — 74177 CT ABD & PELVIS W/CONTRAST: CPT | Mod: 26,MH

## 2021-08-17 ENCOUNTER — NON-APPOINTMENT (OUTPATIENT)
Age: 71
End: 2021-08-17

## 2021-08-19 ENCOUNTER — APPOINTMENT (OUTPATIENT)
Dept: NUCLEAR MEDICINE | Facility: CLINIC | Age: 71
End: 2021-08-19
Payer: MEDICARE

## 2021-08-19 ENCOUNTER — OUTPATIENT (OUTPATIENT)
Dept: OUTPATIENT SERVICES | Facility: HOSPITAL | Age: 71
LOS: 1 days | End: 2021-08-19

## 2021-08-19 DIAGNOSIS — C61 MALIGNANT NEOPLASM OF PROSTATE: ICD-10-CM

## 2021-08-19 PROCEDURE — 78306 BONE IMAGING WHOLE BODY: CPT | Mod: 26

## 2021-08-23 ENCOUNTER — NON-APPOINTMENT (OUTPATIENT)
Age: 71
End: 2021-08-23

## 2021-08-26 ENCOUNTER — APPOINTMENT (OUTPATIENT)
Dept: RADIATION ONCOLOGY | Facility: CLINIC | Age: 71
End: 2021-08-26
Payer: MEDICARE

## 2021-08-26 ENCOUNTER — NON-APPOINTMENT (OUTPATIENT)
Age: 71
End: 2021-08-26

## 2021-08-26 VITALS
WEIGHT: 255 LBS | HEART RATE: 73 BPM | DIASTOLIC BLOOD PRESSURE: 90 MMHG | RESPIRATION RATE: 21 BRPM | BODY MASS INDEX: 35.7 KG/M2 | OXYGEN SATURATION: 98 % | HEIGHT: 71 IN | SYSTOLIC BLOOD PRESSURE: 152 MMHG

## 2021-08-26 DIAGNOSIS — Z80.3 FAMILY HISTORY OF MALIGNANT NEOPLASM OF BREAST: ICD-10-CM

## 2021-08-26 PROCEDURE — 99205 OFFICE O/P NEW HI 60 MIN: CPT | Mod: 25

## 2021-08-26 RX ORDER — LEVETIRACETAM 500 MG/1
500 TABLET, FILM COATED ORAL TWICE DAILY
Qty: 60 | Refills: 5 | Status: DISCONTINUED | COMMUNITY
End: 2021-08-26

## 2021-08-26 RX ORDER — BISACODYL 10 MG/1
19-7 SUPPOSITORY RECTAL
Qty: 1 | Refills: 0 | Status: DISCONTINUED | COMMUNITY
Start: 2021-07-01 | End: 2021-08-26

## 2021-08-26 NOTE — PHYSICAL EXAM
[Normal] : oriented to person, place and time, the affect was normal, the mood was normal and not anxious [FreeTextEntry1] : large external hemorrhoid. Guaic negative

## 2021-08-26 NOTE — HISTORY OF PRESENT ILLNESS
[FreeTextEntry1] : The patient is a 71 year old male diagnosed with prostate cancer. He first presented to Dr. Lorenzana in November 2021 with an elevated PSA of 4.99 ng/ml. Repeat PSA on 5/18/21 was 5.21 ng/ml. Prostate MRI performed on 6/24/21 showed a lesion right, anterior base to apex, transition zone measuring 23 x 17 x 22 mm. No extraprostatic extension seen. No seminal vesicle invasion seen. No pelvic adenopathy seen. Prostate biopsy performed by Dr. Jaime on 7/28/21 showed adenocarcinoma in 4/13 cores. Mark score of  3+3=6 in 2 cores and 3+4=7 in 2 cores. Maximum involvement 90% of the core. CT A/P on 8/16/21 showed no evidence of metastatic disease. Bone scan 8/19/21 was negative for metastatic disease. He reports nocturia x 1 and denies any bowel problems. He has not had a colonoscopy for the past 5 years. He is not sexually active. He has a seizure disorder and last seizure in May 2021 at which time he switched from Keppra to Lamictal. He presents with his wife to discuss the role of radiation therapy in his care.

## 2021-08-26 NOTE — VITALS
[Maximal Pain Intensity: 0/10] : 0/10 [NoTreatment Scheduled] : no treatment scheduled [Date: ____________] : Patient's last distress assessment performed on [unfilled]. [9 - Distress Level] : Distress Level: 9 [Referred Patient  to social work for follow-up] : Patient was referred to social work for follow-up [90: Able to carry normal activity; minor signs or symptoms of disease.] : 90: Able to carry normal activity; minor signs or symptoms of disease.

## 2021-08-31 ENCOUNTER — NON-APPOINTMENT (OUTPATIENT)
Age: 71
End: 2021-08-31

## 2021-09-01 ENCOUNTER — NON-APPOINTMENT (OUTPATIENT)
Age: 71
End: 2021-09-01

## 2021-09-02 ENCOUNTER — RESULT REVIEW (OUTPATIENT)
Age: 71
End: 2021-09-02

## 2021-09-02 ENCOUNTER — OUTPATIENT (OUTPATIENT)
Dept: OUTPATIENT SERVICES | Facility: HOSPITAL | Age: 71
LOS: 1 days | Discharge: HOME | End: 2021-09-02
Payer: MEDICARE

## 2021-09-02 VITALS
WEIGHT: 250 LBS | RESPIRATION RATE: 16 BRPM | OXYGEN SATURATION: 99 % | HEIGHT: 74 IN | TEMPERATURE: 97 F | SYSTOLIC BLOOD PRESSURE: 124 MMHG | DIASTOLIC BLOOD PRESSURE: 114 MMHG | HEART RATE: 83 BPM

## 2021-09-02 DIAGNOSIS — Z12.11 ENCOUNTER FOR SCREENING FOR MALIGNANT NEOPLASM OF COLON: ICD-10-CM

## 2021-09-02 DIAGNOSIS — C61 MALIGNANT NEOPLASM OF PROSTATE: ICD-10-CM

## 2021-09-02 DIAGNOSIS — Z78.9 OTHER SPECIFIED HEALTH STATUS: Chronic | ICD-10-CM

## 2021-09-02 PROCEDURE — 88305 TISSUE EXAM BY PATHOLOGIST: CPT

## 2021-09-02 PROCEDURE — 88305 TISSUE EXAM BY PATHOLOGIST: CPT | Mod: 26

## 2021-09-02 RX ORDER — LAMOTRIGINE 25 MG/1
0 TABLET, ORALLY DISINTEGRATING ORAL
Qty: 0 | Refills: 0 | DISCHARGE

## 2021-09-02 RX ORDER — TELMISARTAN 20 MG/1
0 TABLET ORAL
Qty: 0 | Refills: 0 | DISCHARGE

## 2021-09-02 RX ORDER — APIXABAN 2.5 MG/1
0 TABLET, FILM COATED ORAL
Qty: 0 | Refills: 0 | DISCHARGE

## 2021-09-02 NOTE — ASU PATIENT PROFILE, ADULT - NSICDXPASTMEDICALHX_GEN_ALL_CORE_FT
PAST MEDICAL HISTORY:  Chronic atrial fibrillation     HTN (hypertension)     Prostate CA     Seizures

## 2021-09-09 DIAGNOSIS — K57.30 DIVERTICULOSIS OF LARGE INTESTINE WITHOUT PERFORATION OR ABSCESS WITHOUT BLEEDING: ICD-10-CM

## 2021-09-09 DIAGNOSIS — K62.1 RECTAL POLYP: ICD-10-CM

## 2021-09-09 DIAGNOSIS — I10 ESSENTIAL (PRIMARY) HYPERTENSION: ICD-10-CM

## 2021-09-09 DIAGNOSIS — R21 RASH AND OTHER NONSPECIFIC SKIN ERUPTION: ICD-10-CM

## 2021-09-09 DIAGNOSIS — Z12.11 ENCOUNTER FOR SCREENING FOR MALIGNANT NEOPLASM OF COLON: ICD-10-CM

## 2021-09-09 DIAGNOSIS — C61 MALIGNANT NEOPLASM OF PROSTATE: ICD-10-CM

## 2021-09-09 DIAGNOSIS — G40.909 EPILEPSY, UNSPECIFIED, NOT INTRACTABLE, WITHOUT STATUS EPILEPTICUS: ICD-10-CM

## 2021-09-09 DIAGNOSIS — I48.91 UNSPECIFIED ATRIAL FIBRILLATION: ICD-10-CM

## 2021-09-09 DIAGNOSIS — K63.5 POLYP OF COLON: ICD-10-CM

## 2021-09-09 DIAGNOSIS — Z79.01 LONG TERM (CURRENT) USE OF ANTICOAGULANTS: ICD-10-CM

## 2021-09-09 PROBLEM — R56.9 UNSPECIFIED CONVULSIONS: Chronic | Status: ACTIVE | Noted: 2021-09-02

## 2021-09-09 PROBLEM — I48.20 CHRONIC ATRIAL FIBRILLATION, UNSPECIFIED: Chronic | Status: ACTIVE | Noted: 2021-09-02

## 2021-09-22 ENCOUNTER — OUTPATIENT (OUTPATIENT)
Dept: OUTPATIENT SERVICES | Facility: HOSPITAL | Age: 71
LOS: 1 days | Discharge: HOME | End: 2021-09-22
Payer: MEDICARE

## 2021-09-22 ENCOUNTER — NON-APPOINTMENT (OUTPATIENT)
Age: 71
End: 2021-09-22

## 2021-09-22 VITALS
WEIGHT: 250 LBS | RESPIRATION RATE: 20 BRPM | BODY MASS INDEX: 34.87 KG/M2 | SYSTOLIC BLOOD PRESSURE: 178 MMHG | OXYGEN SATURATION: 96 % | HEART RATE: 73 BPM | DIASTOLIC BLOOD PRESSURE: 137 MMHG

## 2021-09-22 DIAGNOSIS — Z78.9 OTHER SPECIFIED HEALTH STATUS: Chronic | ICD-10-CM

## 2021-09-22 PROCEDURE — 55874 TPRNL PLMT BIODEGRDABL MATRL: CPT

## 2021-09-22 PROCEDURE — 76872 US TRANSRECTAL: CPT | Mod: 26

## 2021-09-22 PROCEDURE — 55876 PLACE RT DEVICE/MARKER PROS: CPT

## 2021-09-23 ENCOUNTER — NON-APPOINTMENT (OUTPATIENT)
Age: 71
End: 2021-09-23

## 2021-09-27 NOTE — PROCEDURE
[FreeTextEntry1] : TRANSPERINEAL PLACEMENT OF SPACEOAR GEL AND MARKERS PLACEMENT [FreeTextEntry3] : Mr. Cyr is a 71 years old patient with Keavy score 3+4=7 adenocarcinoma of the prostate. He presents today for transperineal placement of spaceoar gel and markers in preparation for radiation therapy for his prostate cancer treatment.\par \par Procedure Note: In preparation for the procedure, he self-administered an enema one hour before leaving home and was NPO the night before procedure. He was prescribed a 3 days course of oral antibiotics twice daily to be started a day prior to the procedure. Tropical MALATHI cream was applied to the perineal area one hour prior to procedure. Patient was prescribed and took Valium 5 mg and Tylenol 650 mg upon arrival in the department one hour to procedure.  Procedure risk and benefits were reviewed with patient and a written consent was obtained prior to procedure.  A time out was observed with patient name, date of birth, procedure, position, and site verified. \par \par Patient was placed in a lithotomy position. Chloral prep was used to prep the skin. While maintaining aseptic technique an ultrasound probe was inserted into the rectum to visualize the prostate.  Less than 10 cc of Lidocaine 2% plus 8.4% bicarb was injected subcutaneously. Afterwards, 20 cc of Lidocaine and sodium bicarbonate was injected internally at the prostate apex and bilateral neurovascular bundles for the nerve block.  \par \par Three fiducial markers were prepared on the sterile field. One fiducial marker was placed into each of the following sites: left lobe, right lobe and apex via 14 gauge needles under ultrasound guidance.    \par \par Next, the hydrogel spacer kit was opened onto the sterile field and the hydrogel injection apparatus was prepared. An 18 gauge needle was positioned into the mid-line perirectal fat between the anterior rectal wall and prostate under ultrasound guidance. Less than 10 cc of saline was injected via the needle to hydrodissect the space and confirm proper placement in both axial and sagittal views. The syringe was aspirated to confirm the needle was extravascular.  The syringe was replaced with the hydrogel injection apparatus and the gel was injected over about 10 seconds. The needle was then removed.  There was minimal blood loss. The patient tolerated procedure well.\par \par Patient was transferred to the recovery area on a monitor. Vital signs were stable. He tolerated fluid and a snack by mouth and was made comfortable. He denied pain. Post procedure instruction were given and reviewed with patient. CT/SIM will be done with Dr. Bucio. He was discharged home in a stable condition, accompanied by his wife.\par \par \par \par  [FreeTextEntry2] : IN PREPARATION FOR RADIATION THERAPY FOR PROSTATE CANCER TREATMENT

## 2021-10-04 ENCOUNTER — APPOINTMENT (OUTPATIENT)
Dept: FAMILY MEDICINE | Facility: CLINIC | Age: 71
End: 2021-10-04

## 2021-10-15 VITALS
WEIGHT: 271 LBS | OXYGEN SATURATION: 99 % | SYSTOLIC BLOOD PRESSURE: 120 MMHG | BODY MASS INDEX: 37.8 KG/M2 | HEART RATE: 70 BPM | RESPIRATION RATE: 17 BRPM | DIASTOLIC BLOOD PRESSURE: 82 MMHG

## 2021-10-19 ENCOUNTER — NON-APPOINTMENT (OUTPATIENT)
Age: 71
End: 2021-10-19

## 2021-10-19 VITALS
HEART RATE: 88 BPM | OXYGEN SATURATION: 99 % | BODY MASS INDEX: 37.94 KG/M2 | RESPIRATION RATE: 21 BRPM | WEIGHT: 272 LBS | SYSTOLIC BLOOD PRESSURE: 118 MMHG | DIASTOLIC BLOOD PRESSURE: 80 MMHG

## 2021-10-19 NOTE — HISTORY OF PRESENT ILLNESS
[FreeTextEntry1] : The patient is a 71 year old male diagnosed with prostate cancer. He first presented to Dr. Lorenzana in November 2021 with an elevated PSA of 4.99 ng/ml. Repeat PSA on 5/18/21 was 5.21 ng/ml. Prostate MRI performed on 6/24/21 showed a lesion right, anterior base to apex, transition zone measuring 23 x 17 x 22 mm. No extraprostatic extension seen. No seminal vesicle invasion seen. No pelvic adenopathy seen. Prostate biopsy performed by Dr. Jaime on 7/28/21 showed adenocarcinoma in 4/13 cores. Mark score of  3+3=6 in 2 cores and 3+4=7 in 2 cores. Maximum involvement 90% of the core. CT A/P on 8/16/21 showed no evidence of metastatic disease. Bone scan 8/19/21 was negative for metastatic disease. He reports nocturia x 1 and denies any bowel problems. He has not had a colonoscopy for the past 5 years. He is not sexually active. He has a seizure disorder and last seizure in May 2021 at which time he switched from Keppra to Lamictal. He presents with his wife to discuss the role of radiation therapy in his care. \par \par He presents for OTV 6/29. C/o frequency, urgency and retention during the daytime only, weak stream. He denies dysuria or hematuria, nocturia is unchanged, x1. Moderate appetite and using supplement daily.\par

## 2021-10-19 NOTE — DISEASE MANAGEMENT
[I] : I [1] : T1 [c] : c [X] : MX [0-10] : 0 -10 ng/mL [] : Patient had a Prostate MRI [Clinical] : TNM Stage: c [TTNM] : 1c [NTNM] : 0 [MTNM] : 0 [de-identified] : 1742 [de-identified] : 9489 [de-identified] : prostate

## 2021-10-19 NOTE — REASON FOR VISIT
[Consideration of Curative Therapy] : consideration of curative therapy for prostate cancer [Routine On-Treatment] : a routine on-treatment visit for

## 2021-10-26 ENCOUNTER — NON-APPOINTMENT (OUTPATIENT)
Age: 71
End: 2021-10-26

## 2021-10-26 ENCOUNTER — APPOINTMENT (OUTPATIENT)
Dept: DERMATOLOGY | Facility: CLINIC | Age: 71
End: 2021-10-26

## 2021-10-26 NOTE — HISTORY OF PRESENT ILLNESS
[FreeTextEntry1] : The patient is a 71 year old male diagnosed with prostate cancer. He first presented to Dr. Lorenzana in November 2021 with an elevated PSA of 4.99 ng/ml. Repeat PSA on 5/18/21 was 5.21 ng/ml. Prostate MRI performed on 6/24/21 showed a lesion right, anterior base to apex, transition zone measuring 23 x 17 x 22 mm. No extraprostatic extension seen. No seminal vesicle invasion seen. No pelvic adenopathy seen. Prostate biopsy performed by Dr. Jaime on 7/28/21 showed adenocarcinoma in 4/13 cores. Mark score of  3+3=6 in 2 cores and 3+4=7 in 2 cores. Maximum involvement 90% of the core. CT A/P on 8/16/21 showed no evidence of metastatic disease. Bone scan 8/19/21 was negative for metastatic disease. He reports nocturia x 1 and denies any bowel problems. He has not had a colonoscopy for the past 5 years. He is not sexually active. He has a seizure disorder and last seizure in May 2021 at which time he switched from Keppra to Lamictal. He presents with his wife to discuss the role of radiation therapy in his care. \par \par He presents for OTV 11/28. C/o frequency, urgency and retention, weak stream. He denies dysuria or hemtura, nocturia is unchanged, x1. C/o fatigue.\par

## 2021-11-02 ENCOUNTER — NON-APPOINTMENT (OUTPATIENT)
Age: 71
End: 2021-11-02

## 2021-11-02 VITALS
BODY MASS INDEX: 37.1 KG/M2 | HEART RATE: 79 BPM | OXYGEN SATURATION: 98 % | SYSTOLIC BLOOD PRESSURE: 106 MMHG | RESPIRATION RATE: 17 BRPM | WEIGHT: 266 LBS | DIASTOLIC BLOOD PRESSURE: 70 MMHG

## 2021-11-02 NOTE — DISEASE MANAGEMENT
[Pathological] : TNM Stage: p [I] : I [TTNM] : 1c [NTNM] : 0 [MTNM] : 0 [de-identified] : 9296 [de-identified] : 6769 [de-identified] : Prostate/ SV

## 2021-11-02 NOTE — HISTORY OF PRESENT ILLNESS
[FreeTextEntry1] : The patient is a 71 year old male diagnosed with prostate cancer. He first presented to Dr. Lorenzana in November 2021 with an elevated PSA of 4.99 ng/ml. Repeat PSA on 5/18/21 was 5.21 ng/ml. Prostate MRI performed on 6/24/21 showed a lesion right, anterior base to apex, transition zone measuring 23 x 17 x 22 mm. No extraprostatic extension seen. No seminal vesicle invasion seen. No pelvic adenopathy seen. Prostate biopsy performed by Dr. Jaime on 7/28/21 showed adenocarcinoma in 4/13 cores. Mark score of  3+3=6 in 2 cores and 3+4=7 in 2 cores. Maximum involvement 90% of the core. CT A/P on 8/16/21 showed no evidence of metastatic disease. Bone scan 8/19/21 was negative for metastatic disease. He reports nocturia x 1 and denies any bowel problems. He has not had a colonoscopy for the past 5 years. He is not sexually active. He has a seizure disorder and last seizure in May 2021 at which time he switched from Keppra to Lamictal. He presents with his wife to discuss the role of radiation therapy in his care. \par \par He presents for OTV 16/28. C/o frequency, urgency and retention, weak stream. He denies dysuria or hemtura, nocturia is unchanged, x1. C/o fatigue. Admits to poor hydration. Improved BMs.\par

## 2021-11-09 ENCOUNTER — NON-APPOINTMENT (OUTPATIENT)
Age: 71
End: 2021-11-09

## 2021-11-09 VITALS
HEART RATE: 70 BPM | SYSTOLIC BLOOD PRESSURE: 124 MMHG | OXYGEN SATURATION: 98 % | DIASTOLIC BLOOD PRESSURE: 80 MMHG | BODY MASS INDEX: 37.1 KG/M2 | WEIGHT: 266 LBS | RESPIRATION RATE: 25 BRPM

## 2021-11-09 NOTE — REVIEW OF SYSTEMS
[Constipation: Grade 1 - Occasional or intermittent symptoms; occasional use of stool softeners, laxatives, dietary modification, or enema] : Constipation: Grade 1 - Occasional or intermittent symptoms; occasional use of stool softeners, laxatives, dietary modification, or enema [Hematuria: Grade 0] : Hematuria: Grade 0 [Urinary Incontinence: Grade 1 - Occasional (e.g., with coughing, sneezing, etc.), pads not indicated] : Urinary Incontinence: Grade 1 - Occasional (e.g., with coughing, sneezing, etc.), pads not indicated [Urinary Retention: Grade 1 - Urinary, suprapubic or intermittent catheter placement not indicated; able to void with some residual] : Urinary Retention: Grade 1 - Urinary, suprapubic or intermittent catheter placement not indicated; able to void with some residual [Urinary Tract Pain: Grade 0] : Urinary Tract Pain: Grade 0 [Urinary Urgency: Grade 1 - Present] : Urinary Urgency: Grade 1 - Present [Urinary Frequency: Grade 1 - Present] : Urinary Frequency: Grade 1 - Present

## 2021-11-09 NOTE — HISTORY OF PRESENT ILLNESS
[FreeTextEntry1] : The patient is a 71 year old male diagnosed with prostate cancer. He first presented to Dr. Lorenzana in November 2021 with an elevated PSA of 4.99 ng/ml. Repeat PSA on 5/18/21 was 5.21 ng/ml. Prostate MRI performed on 6/24/21 showed a lesion right, anterior base to apex, transition zone measuring 23 x 17 x 22 mm. No extraprostatic extension seen. No seminal vesicle invasion seen. No pelvic adenopathy seen. Prostate biopsy performed by Dr. Jaime on 7/28/21 showed adenocarcinoma in 4/13 cores. Mark score of  3+3=6 in 2 cores and 3+4=7 in 2 cores. Maximum involvement 90% of the core. CT A/P on 8/16/21 showed no evidence of metastatic disease. Bone scan 8/19/21 was negative for metastatic disease. He reports nocturia x 1 and denies any bowel problems. He has not had a colonoscopy for the past 5 years. He is not sexually active. He has a seizure disorder and last seizure in May 2021 at which time he switched from Keppra to Lamictal. He presents with his wife to discuss the role of radiation therapy in his care. \par \par He presents for OTV 20/28. C/o frequency, urgency and retention, weak stream. Some incontinence noted. He denies dysuria or hematura, nocturia is unchanged, x1. C/o fatigue. Admits to poor hydration. Some constipation noted. \par

## 2021-11-09 NOTE — DISEASE MANAGEMENT
[Pathological] : TNM Stage: p [I] : I [TTNM] : 1c [NTNM] : 0 [MTNM] : 0 [de-identified] : 1400 [de-identified] : 8941 [de-identified] : Prostate/ SV

## 2021-11-16 ENCOUNTER — NON-APPOINTMENT (OUTPATIENT)
Age: 71
End: 2021-11-16

## 2021-11-16 VITALS
HEART RATE: 64 BPM | SYSTOLIC BLOOD PRESSURE: 108 MMHG | WEIGHT: 267 LBS | RESPIRATION RATE: 17 BRPM | BODY MASS INDEX: 37.24 KG/M2 | OXYGEN SATURATION: 99 % | DIASTOLIC BLOOD PRESSURE: 74 MMHG

## 2021-11-16 NOTE — HISTORY OF PRESENT ILLNESS
[FreeTextEntry1] : The patient is a 71 year old male diagnosed with prostate cancer. He first presented to Dr. Lorenzana in November 2021 with an elevated PSA of 4.99 ng/ml. Repeat PSA on 5/18/21 was 5.21 ng/ml. Prostate MRI performed on 6/24/21 showed a lesion right, anterior base to apex, transition zone measuring 23 x 17 x 22 mm. No extraprostatic extension seen. No seminal vesicle invasion seen. No pelvic adenopathy seen. Prostate biopsy performed by Dr. Jaime on 7/28/21 showed adenocarcinoma in 4/13 cores. Mark score of  3+3=6 in 2 cores and 3+4=7 in 2 cores. Maximum involvement 90% of the core. CT A/P on 8/16/21 showed no evidence of metastatic disease. Bone scan 8/19/21 was negative for metastatic disease. He reports nocturia x 1 and denies any bowel problems. He has not had a colonoscopy for the past 5 years. He is not sexually active. He has a seizure disorder and last seizure in May 2021 at which time he switched from Keppra to Lamictal. He presents with his wife to discuss the role of radiation therapy in his care. \par \par He presents for OTV 25/28. C/o frequency, urgency and retention, weak stream. Some incontinence noted. He denies dysuria or hematura, nocturia is unchanged, x1. C/o fatigue. Admits to continued poor hydration. States he is no longer constipated. \par

## 2021-11-16 NOTE — REVIEW OF SYSTEMS
[Hematuria: Grade 0] : Hematuria: Grade 0 [Urinary Incontinence: Grade 0] : Urinary Incontinence: Grade 0  [Urinary Retention: Grade 1 - Urinary, suprapubic or intermittent catheter placement not indicated; able to void with some residual] : Urinary Retention: Grade 1 - Urinary, suprapubic or intermittent catheter placement not indicated; able to void with some residual [Urinary Tract Pain: Grade 0] : Urinary Tract Pain: Grade 0 [Urinary Urgency: Grade 1 - Present] : Urinary Urgency: Grade 1 - Present [Urinary Frequency: Grade 1 - Present] : Urinary Frequency: Grade 1 - Present

## 2021-11-16 NOTE — DISEASE MANAGEMENT
[Pathological] : TNM Stage: p [I] : I [TTNM] : 1c [NTNM] : 0 [MTNM] : 0 [[place] [id number]] : 2432 [de-identified] : 4225 [de-identified] : Prostate/ SV

## 2021-11-22 ENCOUNTER — APPOINTMENT (OUTPATIENT)
Dept: CARDIOLOGY | Facility: CLINIC | Age: 71
End: 2021-11-22
Payer: MEDICARE

## 2021-11-22 ENCOUNTER — NON-APPOINTMENT (OUTPATIENT)
Age: 71
End: 2021-11-22

## 2021-11-22 VITALS
DIASTOLIC BLOOD PRESSURE: 100 MMHG | BODY MASS INDEX: 37.52 KG/M2 | WEIGHT: 268 LBS | HEART RATE: 81 BPM | SYSTOLIC BLOOD PRESSURE: 122 MMHG | HEIGHT: 71 IN | OXYGEN SATURATION: 96 %

## 2021-11-22 PROCEDURE — 93000 ELECTROCARDIOGRAM COMPLETE: CPT

## 2021-11-22 PROCEDURE — 99214 OFFICE O/P EST MOD 30 MIN: CPT

## 2021-11-22 RX ORDER — CIPROFLOXACIN HYDROCHLORIDE 500 MG/1
500 TABLET, FILM COATED ORAL
Qty: 10 | Refills: 0 | Status: DISCONTINUED | COMMUNITY
Start: 2021-09-07 | End: 2021-11-22

## 2021-11-22 NOTE — DISCUSSION/SUMMARY
[FreeTextEntry1] : Pt will continue B/P measurements at home for correlation. Pt's wife (an RN) will monitor. Labs will be obtained. Echo w8i be repeated.

## 2021-11-22 NOTE — HISTORY OF PRESENT ILLNESS
[FreeTextEntry1] : A 71-year-old gentleman with  paroxysmal atrial fibrillation, ascending aorta dilatation. Pt has had EBR for prostatic Ca. He denies chest pain or shortness of breath. He denies palpitations.

## 2021-12-01 ENCOUNTER — APPOINTMENT (OUTPATIENT)
Dept: FAMILY MEDICINE | Facility: CLINIC | Age: 71
End: 2021-12-01
Payer: MEDICARE

## 2021-12-01 VITALS
HEIGHT: 74 IN | HEART RATE: 75 BPM | BODY MASS INDEX: 33.62 KG/M2 | SYSTOLIC BLOOD PRESSURE: 128 MMHG | DIASTOLIC BLOOD PRESSURE: 70 MMHG | OXYGEN SATURATION: 98 % | WEIGHT: 262 LBS | TEMPERATURE: 97.9 F

## 2021-12-01 PROCEDURE — G0438: CPT

## 2021-12-01 PROCEDURE — 36415 COLL VENOUS BLD VENIPUNCTURE: CPT

## 2021-12-01 NOTE — PLAN
[FreeTextEntry1] : Reviewed age-appropriate preventive screening tests with patient. He declined any vaccines today. He is up to date on all other preventive testing.\par \par Discussed clean eating (e.g. Mediterranean style diet) and recommendations for regular exercise/staying as physically active as possible.\par \par Reviewed importance of good self care (e.g. meditation, yoga, adequate rest, regular exercise, magnesium, clean eating, etc.).

## 2021-12-01 NOTE — ASSESSMENT
[FreeTextEntry1] : RC GOLD is a 71 year old male here for a physical exam. He is also here to follow up on the above listed conditions. \par \par He recently saw his cardiologist and does not need an EKG today. His blood pressure and pulse are both normal. He has a prescription a prescription for Rosuvastatin but is not taking this.\par \par His neurologist (Dr. Yusuf) requested blood work, including his Lamictal level, TSH, and B12 (see scanned lab request)

## 2021-12-01 NOTE — HISTORY OF PRESENT ILLNESS
[FreeTextEntry1] : RC GOLD is a 71 year old male here for a physical exam.  [de-identified] : His last PE was 11/2020\par His last tetanus shot was unknown, patient declines\par Pneumovax declined\par Shingrix 5/18/21, 10/2021\par He has had the COVID vaccine\par His last dentist visit was a few years ago\par His last eye doctor appointment was less than one year ago \par His last dermatologist visit was less than one year ago \par His diet is healthy overall\par Exercise: walking\par His last colonoscopy was 9/2/21\par \par He had an elevated PSA first noted at his last PE in 11/2020 (labs were done prior in 10/2020). His PSA was higher in 5/2021 and he was referred back to Urology. He had an MRI which showed a lesion and a biopsy was recommended. The biopsy was positive for Gary 7 (3+4) cancer. Treatment options were discussed and he chose radiation. He recently completed radiation treatment and denies any side effects from this.

## 2021-12-02 ENCOUNTER — NON-APPOINTMENT (OUTPATIENT)
Age: 71
End: 2021-12-02

## 2021-12-02 LAB
ALBUMIN SERPL ELPH-MCNC: 4.8 G/DL
ALP BLD-CCNC: 99 U/L
ALT SERPL-CCNC: 12 U/L
ANION GAP SERPL CALC-SCNC: 13 MMOL/L
AST SERPL-CCNC: 13 U/L
BASOPHILS # BLD AUTO: 0.04 K/UL
BASOPHILS NFR BLD AUTO: 0.8 %
BILIRUB SERPL-MCNC: 0.6 MG/DL
BUN SERPL-MCNC: 12 MG/DL
CALCIUM SERPL-MCNC: 10 MG/DL
CHLORIDE SERPL-SCNC: 105 MMOL/L
CHOLEST SERPL-MCNC: 218 MG/DL
CO2 SERPL-SCNC: 25 MMOL/L
CREAT SERPL-MCNC: 1.1 MG/DL
EOSINOPHIL # BLD AUTO: 0.16 K/UL
EOSINOPHIL NFR BLD AUTO: 3.3 %
GLUCOSE SERPL-MCNC: 96 MG/DL
HCT VFR BLD CALC: 48.7 %
HDLC SERPL-MCNC: 45 MG/DL
HGB BLD-MCNC: 15.4 G/DL
IMM GRANULOCYTES NFR BLD AUTO: 0.2 %
LDLC SERPL CALC-MCNC: 150 MG/DL
LYMPHOCYTES # BLD AUTO: 0.9 K/UL
LYMPHOCYTES NFR BLD AUTO: 18.7 %
MAN DIFF?: NORMAL
MCHC RBC-ENTMCNC: 28.7 PG
MCHC RBC-ENTMCNC: 31.6 GM/DL
MCV RBC AUTO: 90.7 FL
MONOCYTES # BLD AUTO: 0.43 K/UL
MONOCYTES NFR BLD AUTO: 8.9 %
NEUTROPHILS # BLD AUTO: 3.28 K/UL
NEUTROPHILS NFR BLD AUTO: 68.1 %
NONHDLC SERPL-MCNC: 174 MG/DL
PLATELET # BLD AUTO: 345 K/UL
POTASSIUM SERPL-SCNC: 4.9 MMOL/L
PROT SERPL-MCNC: 7.3 G/DL
RBC # BLD: 5.37 M/UL
RBC # FLD: 14 %
SODIUM SERPL-SCNC: 143 MMOL/L
TRIGL SERPL-MCNC: 119 MG/DL
TSH SERPL-ACNC: 2.73 UIU/ML
VIT B12 SERPL-MCNC: 497 PG/ML
WBC # FLD AUTO: 4.82 K/UL

## 2021-12-07 ENCOUNTER — NON-APPOINTMENT (OUTPATIENT)
Age: 71
End: 2021-12-07

## 2021-12-07 LAB — LAMOTRIGINE SERPL-MCNC: 5.6 UG/ML

## 2021-12-17 ENCOUNTER — APPOINTMENT (OUTPATIENT)
Dept: RADIATION ONCOLOGY | Facility: CLINIC | Age: 71
End: 2021-12-17
Payer: MEDICARE

## 2021-12-17 VITALS
SYSTOLIC BLOOD PRESSURE: 122 MMHG | OXYGEN SATURATION: 98 % | BODY MASS INDEX: 35.04 KG/M2 | HEART RATE: 76 BPM | WEIGHT: 273 LBS | HEIGHT: 74 IN | DIASTOLIC BLOOD PRESSURE: 80 MMHG | RESPIRATION RATE: 20 BRPM

## 2021-12-17 PROCEDURE — 99024 POSTOP FOLLOW-UP VISIT: CPT

## 2021-12-17 NOTE — DISEASE MANAGEMENT
[1] : T1 [c] : c [0] : M0 [Biopsy] : Patient had a biopsy on [7(3+4)] : Template Biopsy [place] Score: 7(3+4) [] : Patient had a Prostate MRI [I] : I [BiopsyDate] : 07/21 [TotalCores] : 13 [TotalPositiveCores] : 4

## 2021-12-17 NOTE — HISTORY OF PRESENT ILLNESS
[FreeTextEntry1] : The patient is a 71 year old male diagnosed with prostate cancer. He first presented to Dr. Lorenzana in November 2021 with an elevated PSA of 4.99 ng/ml. Repeat PSA on 5/18/21 was 5.21 ng/ml. Prostate MRI performed on 6/24/21 showed a lesion right, anterior base to apex, transition zone measuring 23 x 17 x 22 mm. No extraprostatic extension seen. No seminal vesicle invasion seen. No pelvic adenopathy seen. Prostate biopsy performed by Dr. Jaime on 7/28/21 showed adenocarcinoma in 4/13 cores. Mark score of  3+3=6 in 2 cores and 3+4=7 in 2 cores. Maximum involvement 90% of the core. CT A/P on 8/16/21 showed no evidence of metastatic disease. Bone scan 8/19/21 was negative for metastatic disease. He reports nocturia x 1 and denies any bowel problems. He has not had a colonoscopy for the past 5 years. He is not sexually active. He has a seizure disorder and last seizure in May 2021 at which time he switched from Keppra to Lamictal. He presents with his wife to discuss the role of radiation therapy in his care. He completed a dose of 7000 cGy to the prostate/SV on 11/19/21. \par \par He presents for a one month PTE. C/o frequency, urgency and retention, weak stream. Nocturia 1x, previously 4x. Incontinence resolved. He denies dysuria or hematura. C/o fatigue, but improving. Admits to continued poor hydration. Reports that radiotherapy improved his BM to near normal. Leaving for [place] tomorrow until April.\par

## 2022-08-03 ENCOUNTER — APPOINTMENT (OUTPATIENT)
Dept: DERMATOLOGY | Facility: CLINIC | Age: 72
End: 2022-08-03

## 2022-08-10 ENCOUNTER — NON-APPOINTMENT (OUTPATIENT)
Age: 72
End: 2022-08-10

## 2022-08-10 ENCOUNTER — APPOINTMENT (OUTPATIENT)
Dept: RADIATION ONCOLOGY | Facility: CLINIC | Age: 72
End: 2022-08-10

## 2022-08-12 ENCOUNTER — APPOINTMENT (OUTPATIENT)
Dept: RADIATION ONCOLOGY | Facility: CLINIC | Age: 72
End: 2022-08-12

## 2022-08-12 VITALS
HEART RATE: 70 BPM | WEIGHT: 269 LBS | DIASTOLIC BLOOD PRESSURE: 72 MMHG | BODY MASS INDEX: 34.52 KG/M2 | RESPIRATION RATE: 20 BRPM | HEIGHT: 74 IN | SYSTOLIC BLOOD PRESSURE: 118 MMHG | OXYGEN SATURATION: 97 %

## 2022-08-12 PROCEDURE — 99213 OFFICE O/P EST LOW 20 MIN: CPT

## 2022-08-12 NOTE — HISTORY OF PRESENT ILLNESS
[FreeTextEntry1] : The patient is a 72 year old male diagnosed with prostate cancer. He first presented to Dr. Lorenzana in November 2021 with an elevated PSA of 4.99 ng/ml. Repeat PSA on 5/18/21 was 5.21 ng/ml. Prostate MRI performed on 6/24/21 showed a lesion right, anterior base to apex, transition zone measuring 23 x 17 x 22 mm. No extraprostatic extension seen. No seminal vesicle invasion seen. No pelvic adenopathy seen. Prostate biopsy performed by Dr. Jaime on 7/28/21 showed adenocarcinoma in 4/13 cores. Mark score of  3+3=6 in 2 cores and 3+4=7 in 2 cores. Maximum involvement 90% of the core. CT A/P on 8/16/21 showed no evidence of metastatic disease. Bone scan 8/19/21 was negative for metastatic disease. He reports nocturia x 1 and denies any bowel problems. He has not had a colonoscopy for the past 5 years. He is not sexually active. He has a seizure disorder and last seizure in May 2021 at which time he switched from Keppra to Lamictal. He presents with his wife to discuss the role of radiation therapy in his care. He completed a dose of 7000 cGy to the prostate/SV on 11/19/21. \par \par PSA on 7/19/22 was 0.5 ng/ml\par \par He presents for a 9 month follow up.He missed earlier follow up and PSA as he was living in [place] for the winter. He reports urinary urgency and nocturia x 1 (baseline). He denies dysuria or hematura. Chronic constipation and occ hemorrhoids. PSA in July decreased significantly.\par

## 2022-08-18 NOTE — ASU PATIENT PROFILE, ADULT - ABILITY TO HEAR (WITH HEARING AID OR HEARING APPLIANCE IF NORMALLY USED):
Adequate: hears normal conversation without difficulty [Chaperone Present] : A chaperone was present in the examining room during all aspects of the physical examination [FreeTextEntry1] : Eusebio toner [Appropriately responsive] : appropriately responsive [Alert] : alert [No Acute Distress] : no acute distress [Soft] : soft [Non-tender] : non-tender [Non-distended] : non-distended [Oriented x3] : oriented x3 [Examination Of The Breasts] : a normal appearance [No Masses] : no breast masses were palpable [Labia Majora] : normal [Labia Minora] : normal [Normal] : normal [Uterine Adnexae] : normal

## 2022-10-03 ENCOUNTER — APPOINTMENT (OUTPATIENT)
Dept: CARDIOLOGY | Facility: CLINIC | Age: 72
End: 2022-10-03

## 2022-10-06 ENCOUNTER — RX RENEWAL (OUTPATIENT)
Age: 72
End: 2022-10-06

## 2022-10-06 ENCOUNTER — APPOINTMENT (OUTPATIENT)
Dept: CARDIOLOGY | Facility: CLINIC | Age: 72
End: 2022-10-06

## 2022-10-06 VITALS
HEIGHT: 74 IN | WEIGHT: 279.98 LBS | DIASTOLIC BLOOD PRESSURE: 86 MMHG | SYSTOLIC BLOOD PRESSURE: 150 MMHG | OXYGEN SATURATION: 98 % | HEART RATE: 76 BPM | BODY MASS INDEX: 35.93 KG/M2

## 2022-10-06 PROCEDURE — 93000 ELECTROCARDIOGRAM COMPLETE: CPT

## 2022-10-06 PROCEDURE — 99214 OFFICE O/P EST MOD 30 MIN: CPT

## 2022-10-06 RX ORDER — NAPROXEN 500 MG/1
500 TABLET, DELAYED RELEASE ORAL
Qty: 60 | Refills: 0 | Status: DISCONTINUED | COMMUNITY
Start: 2021-06-02 | End: 2022-10-06

## 2022-10-11 ENCOUNTER — RX RENEWAL (OUTPATIENT)
Age: 72
End: 2022-10-11

## 2022-10-11 NOTE — HISTORY OF PRESENT ILLNESS
[FreeTextEntry1] : 73 Y/O gentleman PMH: HTN, Seizure disorder, Dilated AO/ascending Aorta, AF who presents today in routine cardiac follow up; now being treated for prostate cancer \par \par Pressure today 152/82 which per patients wife ( who is a Nurse practitioner ) pressure at home 130's/70's \par \par Historically, patient had prior CTA chest 2/2 dilated AO resulting in AO of 5.0 cm, Ascending 4.5 cm he was referred at that time to follow with Dr Molina; + Thyroid/pulmonary nodule referred at that time to Endocrinology and pulmonary; Gallstones and Lipoma referred to Dr Landeros.\par The above findings were discussed again at today's visit with patient and wife\par \par \par \par \par

## 2022-10-11 NOTE — DISCUSSION/SUMMARY
[FreeTextEntry1] : He today in routine cardiac follow up \par Claims to be feeling well no complaints offered at this time\par He is currently being treated for prostate cancer\par \par Historically, patient had prior CTA chest 2/2 dilated AO resulting in AO of 5.0 cm, Ascending 4.5 cm he was referred at that time to follow with Dr Molina; + Thyroid/pulmonary nodule referred at that time to Endocrinology and pulmonary; Gallstones and Lipoma referred to Dr Landeros.\par The above findings were discussed again at today's visit with patient and wife\par F/U CTA Chest Aorta ordered ( Declines surgical Consult at this time ) will await labs to be obtained with PCP\par He will return for Echo/stress, Carotid US ( Mild disease on prior study 2017 )\par \par Will discuss sleep study with PCP  [EKG obtained to assist in diagnosis and management of assessed problem(s)] : EKG obtained to assist in diagnosis and management of assessed problem(s)

## 2022-10-14 ENCOUNTER — APPOINTMENT (OUTPATIENT)
Dept: FAMILY MEDICINE | Facility: CLINIC | Age: 72
End: 2022-10-14

## 2022-10-14 VITALS
DIASTOLIC BLOOD PRESSURE: 108 MMHG | SYSTOLIC BLOOD PRESSURE: 144 MMHG | BODY MASS INDEX: 35.81 KG/M2 | OXYGEN SATURATION: 98 % | WEIGHT: 279 LBS | HEIGHT: 74 IN | HEART RATE: 87 BPM | TEMPERATURE: 97 F

## 2022-10-14 VITALS — SYSTOLIC BLOOD PRESSURE: 132 MMHG | DIASTOLIC BLOOD PRESSURE: 84 MMHG

## 2022-10-14 DIAGNOSIS — E66.01 MORBID (SEVERE) OBESITY DUE TO EXCESS CALORIES: Chronic | ICD-10-CM

## 2022-10-14 DIAGNOSIS — G40.909 EPILEPSY, UNSPECIFIED, NOT INTRACTABLE, W/OUT STATUS EPILEPTICUS: ICD-10-CM

## 2022-10-14 PROCEDURE — 99214 OFFICE O/P EST MOD 30 MIN: CPT | Mod: 25

## 2022-10-14 PROCEDURE — 90686 IIV4 VACC NO PRSV 0.5 ML IM: CPT

## 2022-10-14 PROCEDURE — 36415 COLL VENOUS BLD VENIPUNCTURE: CPT

## 2022-10-14 PROCEDURE — G0008: CPT

## 2022-10-14 NOTE — PLAN
[FreeTextEntry1] : Continue all medications as prescribed. Check labs as above. Will adjust any medications based upon lab results. He agrees to start Crestor.\par \par Reviewed age-appropriate preventive screening tests with patient. He agreed to a flu shot. He declined Prevnar. \par \par Discussed clean eating (e.g. Mediterranean style diet) and recommendations for regular exercise/staying as physically active as possible. \par \par Reviewed importance of good self care (e.g. meditation, yoga, adequate rest, regular exercise, magnesium, clean eating, etc.).

## 2022-10-14 NOTE — ASSESSMENT
[FreeTextEntry1] : He is here to follow up on atrial fibrillation, hypertension, hypercholesterolemia, prostate cancer, and seizure disorder. He also has a dilated aortic root, monitored by cardiology. \par \par He is here to repeat his labs. He was prescribed Rosuvastatin initially in 6/2021 by his cardiologist but did not start this. He was advised to start it based upon his labs done in 12/2021 but he did not. He now agrees to start the medication. Will recheck his labs first but since he has not changed his diet significantly his cholesterol would not be expected to improve.

## 2022-10-14 NOTE — HISTORY OF PRESENT ILLNESS
[FreeTextEntry1] : RC ASIF is a 72 year old male here for a follow up visit.  [de-identified] : He has a history of atrial fibrillation, hypertension, hypercholesterolemia, prostate cancer, and seizure disorder. His last visit in our office was in 12/2021 for his annual physical. His cholesterol was elevated and he was advised to start Rosuvastatin and return in 3 months to repeat his labs. This is his first visit to the office since that time. He admits that he did not start Rosuvastatin. He did see the NP at his cardiologist s office last week but he did not have blood work done at that time.

## 2022-10-14 NOTE — HEALTH RISK ASSESSMENT
[No falls in past year] : Patient reported no falls in the past year [0] : 2) Feeling down, depressed, or hopeless: Not at all (0) [PHQ-2 Negative - No further assessment needed] : PHQ-2 Negative - No further assessment needed [[id number]] : 0

## 2022-10-14 NOTE — PHYSICAL EXAM
[No Acute Distress] : no acute distress [Well Nourished] : well nourished [Well Developed] : well developed [Well-Appearing] : well-appearing [Normal Sclera/Conjunctiva] : normal sclera/conjunctiva [PERRL] : pupils equal round and reactive to light [No JVD] : no jugular venous distention [No Lymphadenopathy] : no lymphadenopathy [Supple] : supple [Thyroid Normal, No Nodules] : the thyroid was normal and there were no nodules present [No Respiratory Distress] : no respiratory distress  [No Accessory Muscle Use] : no accessory muscle use [Clear to Auscultation] : lungs were clear to auscultation bilaterally [Normal Rate] : normal rate  [Regular Rhythm] : with a regular rhythm [Normal S1, S2] : normal S1 and S2 [No Murmur] : no murmur heard [No Carotid Bruits] : no carotid bruits [No Edema] : there was no peripheral edema [No Extremity Clubbing/Cyanosis] : no extremity clubbing/cyanosis [Soft] : abdomen soft [Non Tender] : non-tender [Normal Bowel Sounds] : normal bowel sounds [Normal Posterior Cervical Nodes] : no posterior cervical lymphadenopathy [Normal Anterior Cervical Nodes] : no anterior cervical lymphadenopathy [No CVA Tenderness] : no CVA  tenderness [No Spinal Tenderness] : no spinal tenderness [No Joint Swelling] : no joint swelling [Grossly Normal Strength/Tone] : grossly normal strength/tone [No Rash] : no rash [Coordination Grossly Intact] : coordination grossly intact [No Focal Deficits] : no focal deficits [Normal Gait] : normal gait [Normal Affect] : the affect was normal [Normal Insight/Judgement] : insight and judgment were intact [de-identified] : esotropia

## 2022-10-17 LAB
ALBUMIN SERPL ELPH-MCNC: 5.1 G/DL
ALP BLD-CCNC: 69 U/L
ALT SERPL-CCNC: 15 U/L
ANION GAP SERPL CALC-SCNC: 14 MMOL/L
AST SERPL-CCNC: 15 U/L
BASOPHILS # BLD AUTO: 0.04 K/UL
BASOPHILS NFR BLD AUTO: 0.7 %
BILIRUB SERPL-MCNC: 0.8 MG/DL
BUN SERPL-MCNC: 16 MG/DL
CALCIUM SERPL-MCNC: 10.4 MG/DL
CHLORIDE SERPL-SCNC: 102 MMOL/L
CHOLEST SERPL-MCNC: 240 MG/DL
CO2 SERPL-SCNC: 24 MMOL/L
CREAT SERPL-MCNC: 1.15 MG/DL
EGFR: 68 ML/MIN/1.73M2
EOSINOPHIL # BLD AUTO: 0.23 K/UL
EOSINOPHIL NFR BLD AUTO: 4.3 %
GLUCOSE SERPL-MCNC: 91 MG/DL
HCT VFR BLD CALC: 46.8 %
HDLC SERPL-MCNC: 49 MG/DL
HGB BLD-MCNC: 15.7 G/DL
IMM GRANULOCYTES NFR BLD AUTO: 0.2 %
LDLC SERPL CALC-MCNC: 172 MG/DL
LYMPHOCYTES # BLD AUTO: 1.49 K/UL
LYMPHOCYTES NFR BLD AUTO: 27.8 %
MAN DIFF?: NORMAL
MCHC RBC-ENTMCNC: 29.8 PG
MCHC RBC-ENTMCNC: 33.5 GM/DL
MCV RBC AUTO: 88.8 FL
MONOCYTES # BLD AUTO: 0.48 K/UL
MONOCYTES NFR BLD AUTO: 9 %
NEUTROPHILS # BLD AUTO: 3.11 K/UL
NEUTROPHILS NFR BLD AUTO: 58 %
NONHDLC SERPL-MCNC: 191 MG/DL
PLATELET # BLD AUTO: 270 K/UL
POTASSIUM SERPL-SCNC: 4.6 MMOL/L
PROT SERPL-MCNC: 7.3 G/DL
PSA SERPL-MCNC: 0.42 NG/ML
RBC # BLD: 5.27 M/UL
RBC # FLD: 13.7 %
SODIUM SERPL-SCNC: 140 MMOL/L
TRIGL SERPL-MCNC: 99 MG/DL
WBC # FLD AUTO: 5.36 K/UL

## 2022-10-18 ENCOUNTER — NON-APPOINTMENT (OUTPATIENT)
Age: 72
End: 2022-10-18

## 2022-11-01 ENCOUNTER — APPOINTMENT (OUTPATIENT)
Dept: CARDIOLOGY | Facility: CLINIC | Age: 72
End: 2022-11-01

## 2022-11-01 PROCEDURE — 93306 TTE W/DOPPLER COMPLETE: CPT

## 2022-11-01 PROCEDURE — 93880 EXTRACRANIAL BILAT STUDY: CPT

## 2022-11-08 ENCOUNTER — NON-APPOINTMENT (OUTPATIENT)
Age: 72
End: 2022-11-08

## 2022-12-01 ENCOUNTER — APPOINTMENT (OUTPATIENT)
Dept: FAMILY MEDICINE | Facility: CLINIC | Age: 72
End: 2022-12-01

## 2023-02-02 ENCOUNTER — RX RENEWAL (OUTPATIENT)
Age: 73
End: 2023-02-02

## 2023-06-23 ENCOUNTER — NON-APPOINTMENT (OUTPATIENT)
Age: 73
End: 2023-06-23

## 2023-07-06 ENCOUNTER — RX RENEWAL (OUTPATIENT)
Age: 73
End: 2023-07-06

## 2023-07-25 DIAGNOSIS — M54.50 LOW BACK PAIN, UNSPECIFIED: ICD-10-CM

## 2023-08-14 ENCOUNTER — APPOINTMENT (OUTPATIENT)
Dept: RADIATION ONCOLOGY | Facility: CLINIC | Age: 73
End: 2023-08-14
Payer: MEDICARE

## 2023-08-14 ENCOUNTER — NON-APPOINTMENT (OUTPATIENT)
Age: 73
End: 2023-08-14

## 2023-08-14 VITALS
BODY MASS INDEX: 34.01 KG/M2 | SYSTOLIC BLOOD PRESSURE: 122 MMHG | HEIGHT: 74 IN | DIASTOLIC BLOOD PRESSURE: 84 MMHG | RESPIRATION RATE: 18 BRPM | OXYGEN SATURATION: 96 % | HEART RATE: 78 BPM | WEIGHT: 265 LBS

## 2023-08-14 PROCEDURE — 99213 OFFICE O/P EST LOW 20 MIN: CPT

## 2023-08-14 RX ORDER — DIAZEPAM 5 MG/1
5 TABLET ORAL
Refills: 0 | Status: DISCONTINUED | COMMUNITY
End: 2023-08-14

## 2023-08-14 NOTE — DISEASE MANAGEMENT
[1] : T1 [c] : c [0] : M0 [Biopsy] : Patient had a biopsy on [7(3+4)] : Template Biopsy [place] Score: 7(3+4) [] : Patient had a Prostate MRI [Pathological] : TNM Stage: p [I] : I [BiopsyDate] : 07/21 [TotalCores] : 13 [TotalPositiveCores] : 4 [TTNM] : 1 [NTNM] : 0 [MTNM] : 0

## 2023-08-14 NOTE — HISTORY OF PRESENT ILLNESS
[FreeTextEntry1] : The patient is a 71 year old male treated for prostate cancer.  He first presented to Dr. Lorenzana in 2021 with an elevated PSA of 4.99 ng/ml. Repeat PSA on 21 was 5.21 ng/ml. Prostate MRI performed on 21 showed a lesion right, anterior base to apex, transition zone measuring 23 x 17 x 22 mm. No extraprostatic extension seen. No seminal vesicle invasion seen. No pelvic adenopathy seen. Prostate biopsy performed by Dr. Jaime on 21 showed adenocarcinoma in 4/13 cores. Amrk score of  3+3=6 in 2 cores and 3+4=7 in 2 cores. Maximum involvement 90% of the core. CT A/P on 21 showed no evidence of metastatic disease. Bone scan 21 was negative for metastatic disease. He reports nocturia x 1 and denies any bowel problems. He has not had a colonoscopy for the past 5 years. He is not sexually active. He has a seizure disorder and last seizure in May 2021 at which time he switched from Keppra to Lamictal.    He completed a dose of 7000 cGy to the prostate/SV on 21.   PSA  trendin2021= 5.21 10/14/2022= 0.42  He presents for 2 year 9 months follow up.  Patient reports nocturia x 1 at 2am reliably with some degree of urgency during a day. Denies hematuria, He plays golf regularly, c/o getting tired more quickly than in the past. PSA drawn today. Reports chronic constipation -advised to try ground flaxseeds daily. Plans to return to [place], [place] for most of year.

## 2023-08-14 NOTE — REASON FOR VISIT
[Post-Treatment Evaluation] : post-treatment evaluation for prostate cancer [Prostate Cancer] : prostate cancer [Routine Follow-Up] : a routine follow-up visit for prostate cancer

## 2023-08-22 ENCOUNTER — APPOINTMENT (OUTPATIENT)
Dept: FAMILY MEDICINE | Facility: CLINIC | Age: 73
End: 2023-08-22
Payer: MEDICARE

## 2023-08-22 PROCEDURE — 36415 COLL VENOUS BLD VENIPUNCTURE: CPT

## 2023-08-24 LAB
ALBUMIN SERPL ELPH-MCNC: 4.8 G/DL
ALP BLD-CCNC: 91 U/L
ALT SERPL-CCNC: 12 U/L
ANION GAP SERPL CALC-SCNC: 11 MMOL/L
AST SERPL-CCNC: 11 U/L
BASOPHILS # BLD AUTO: 0.06 K/UL
BASOPHILS NFR BLD AUTO: 1.2 %
BILIRUB SERPL-MCNC: 0.6 MG/DL
BUN SERPL-MCNC: 14 MG/DL
CALCIUM SERPL-MCNC: 10.1 MG/DL
CHLORIDE SERPL-SCNC: 104 MMOL/L
CHOLEST SERPL-MCNC: 128 MG/DL
CK SERPL-CCNC: 40 U/L
CO2 SERPL-SCNC: 26 MMOL/L
CREAT SERPL-MCNC: 1.27 MG/DL
EGFR: 60 ML/MIN/1.73M2
EOSINOPHIL # BLD AUTO: 0.24 K/UL
EOSINOPHIL NFR BLD AUTO: 4.9 %
GLUCOSE SERPL-MCNC: 92 MG/DL
HCT VFR BLD CALC: 45.8 %
HDLC SERPL-MCNC: 49 MG/DL
HGB BLD-MCNC: 14.3 G/DL
IMM GRANULOCYTES NFR BLD AUTO: 0.2 %
LDLC SERPL CALC-MCNC: 61 MG/DL
LYMPHOCYTES # BLD AUTO: 1.21 K/UL
LYMPHOCYTES NFR BLD AUTO: 24.8 %
MAN DIFF?: NORMAL
MCHC RBC-ENTMCNC: 29.4 PG
MCHC RBC-ENTMCNC: 31.2 GM/DL
MCV RBC AUTO: 94.2 FL
MONOCYTES # BLD AUTO: 0.47 K/UL
MONOCYTES NFR BLD AUTO: 9.6 %
NEUTROPHILS # BLD AUTO: 2.89 K/UL
NEUTROPHILS NFR BLD AUTO: 59.3 %
NONHDLC SERPL-MCNC: 78 MG/DL
PLATELET # BLD AUTO: 287 K/UL
POTASSIUM SERPL-SCNC: 4.9 MMOL/L
PROT SERPL-MCNC: 7.1 G/DL
RBC # BLD: 4.86 M/UL
RBC # FLD: 14 %
SODIUM SERPL-SCNC: 141 MMOL/L
TRIGL SERPL-MCNC: 88 MG/DL
WBC # FLD AUTO: 4.88 K/UL

## 2023-08-29 ENCOUNTER — APPOINTMENT (OUTPATIENT)
Dept: FAMILY MEDICINE | Facility: CLINIC | Age: 73
End: 2023-08-29
Payer: MEDICARE

## 2023-08-29 VITALS
DIASTOLIC BLOOD PRESSURE: 82 MMHG | TEMPERATURE: 97 F | HEART RATE: 81 BPM | HEIGHT: 75 IN | OXYGEN SATURATION: 97 % | WEIGHT: 271 LBS | BODY MASS INDEX: 33.69 KG/M2 | SYSTOLIC BLOOD PRESSURE: 108 MMHG

## 2023-08-29 DIAGNOSIS — E78.00 PURE HYPERCHOLESTEROLEMIA, UNSPECIFIED: ICD-10-CM

## 2023-08-29 DIAGNOSIS — I10 ESSENTIAL (PRIMARY) HYPERTENSION: ICD-10-CM

## 2023-08-29 DIAGNOSIS — I48.91 UNSPECIFIED ATRIAL FIBRILLATION: ICD-10-CM

## 2023-08-29 DIAGNOSIS — Z00.00 ENCOUNTER FOR GENERAL ADULT MEDICAL EXAMINATION W/OUT ABNORMAL FINDINGS: ICD-10-CM

## 2023-08-29 DIAGNOSIS — G47.00 INSOMNIA, UNSPECIFIED: ICD-10-CM

## 2023-08-29 DIAGNOSIS — G40.909 EPILEPSY, UNSPECIFIED, NOT INTRACTABLE, W/OUT STATUS EPILEPTICUS: ICD-10-CM

## 2023-08-29 DIAGNOSIS — C61 MALIGNANT NEOPLASM OF PROSTATE: ICD-10-CM

## 2023-08-29 PROCEDURE — G0439: CPT

## 2023-08-29 PROCEDURE — 90686 IIV4 VACC NO PRSV 0.5 ML IM: CPT

## 2023-08-29 PROCEDURE — G0008: CPT

## 2023-08-29 RX ORDER — KETOCONAZOLE 20.5 MG/ML
2 SHAMPOO, SUSPENSION TOPICAL
Qty: 120 | Refills: 0 | Status: COMPLETED | COMMUNITY
Start: 2022-06-27 | End: 2023-08-29

## 2023-08-29 RX ORDER — FLUOCINOLONE ACETONIDE 0.25 MG/G
0.03 CREAM TOPICAL TWICE DAILY
Qty: 1 | Refills: 1 | Status: COMPLETED | COMMUNITY
Start: 2021-09-09 | End: 2023-08-29

## 2023-08-29 RX ORDER — CLOBETASOL PROPIONATE 0.5 MG/G
0.05 CREAM TOPICAL
Qty: 15 | Refills: 0 | Status: COMPLETED | COMMUNITY
Start: 2022-08-16 | End: 2023-08-29

## 2023-08-29 RX ORDER — LAMOTRIGINE 100 MG/1
100 TABLET ORAL
Refills: 0 | Status: ACTIVE | COMMUNITY
Start: 2021-06-18

## 2023-08-29 NOTE — ASSESSMENT
[FreeTextEntry1] : RC GOLD is a 73 year old male here for a physical exam.  He has a history of atrial fibrillation, hypertension, hypercholesterolemia, prostate cancer, and seizure disorder. His cholesterol was elevated at his annual physical in 12/2021 and he was advised to start Rosuvastatin and return in 3 months to repeat his labs. He did not start the Rosuvastatin until 10/2022 which was his last visit to the office.  Labs were done prior. His CBC, CMP, Lipids, and CPK are all normal. His cholesterol is much improved on Rosuvastatin. His PSA was 0.3 last week with his radiation oncologist.   He is planning to see his cardiologist soon and declined an EKG today.  He reports bumps on his great toes and one on his left ankle. 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 He has a history of gout but has not had a gout attack in many years. He is not currently taking any medication for gout but is willing to try Allopurinol to see if this reduces the size of the tophi.  He also complains of a dry scalp. This is a known side effect of Lamotrigine. He is using OTC hydrocortisone cream and a hair oil which help somewhat but nothing seems to completely resolve the dry skin.

## 2023-08-29 NOTE — PLAN
[FreeTextEntry1] : Continue all medications as prescribed. Follow up with all specialists as scheduled.  Reviewed age-appropriate preventive screening tests with patient. He is due for a flu shot, Prevnar 20, and Tdap. He agreed to a flu shot and will consider the other two vaccines.  Discussed clean eating (eg Mediterranean style eating plan) and regular exercise/staying as physically active as possible.  Include balance exercises and strength training and core strengthening exercises for bone health and to decrease risk for falls.  Reviewed importance of good self care (e.g. meditation, yoga, adequate rest, regular exercise, magnesium, clean eating, etc.).  Follow up for next physical in one year.

## 2023-08-29 NOTE — HEALTH RISK ASSESSMENT
[No falls in past year] : Patient reported no falls in the past year [0] : 2) Feeling down, depressed, or hopeless: Not at all (0) [PHQ-2 Negative - No further assessment needed] : PHQ-2 Negative - No further assessment needed [Current] : Current [20 or more] : 20 or more [[id number]] : 0

## 2023-08-29 NOTE — PHYSICAL EXAM
[No Acute Distress] : no acute distress [Well Nourished] : well nourished [Well Developed] : well developed [Well-Appearing] : well-appearing [Normal Outer Ear/Nose] : the outer ears and nose were normal in appearance [Normal Oropharynx] : the oropharynx was normal [No JVD] : no jugular venous distention [Supple] : supple [Thyroid Normal, No Nodules] : the thyroid was normal and there were no nodules present [No Respiratory Distress] : no respiratory distress  [No Accessory Muscle Use] : no accessory muscle use [Clear to Auscultation] : lungs were clear to auscultation bilaterally [Regular Rhythm] : with a regular rhythm [Normal Rate] : normal rate  [Normal S1, S2] : normal S1 and S2 [No Murmur] : no murmur heard [No Carotid Bruits] : no carotid bruits [Pedal Pulses Present] : the pedal pulses are present [No Edema] : there was no peripheral edema [No Extremity Clubbing/Cyanosis] : no extremity clubbing/cyanosis [Non Tender] : non-tender [Soft] : abdomen soft [No Masses] : no abdominal mass palpated [Normal Bowel Sounds] : normal bowel sounds [No CVA Tenderness] : no CVA  tenderness [No Spinal Tenderness] : no spinal tenderness [No Joint Swelling] : no joint swelling [Grossly Normal Strength/Tone] : grossly normal strength/tone [No Rash] : no rash [No Focal Deficits] : no focal deficits [Normal Gait] : normal gait [Normal Affect] : the affect was normal [Normal Insight/Judgement] : insight and judgment were intact [Normal] : affect was normal and insight and judgment were intact [de-identified] : esotropia

## 2023-08-29 NOTE — HISTORY OF PRESENT ILLNESS
[FreeTextEntry1] : RC GOLD is a 73 year old male here for a physical exam. [de-identified] : His last physical exam was 12/1/2021  Vaccines: Tetanus is not up to date, patient declined Pneumococcal vaccination is not up to date, patient declined Shingrix is up to date COVID vaccine is up to date  His last dentist visit was a few years ago His last eye doctor appointment was less than one year ago His last dermatologist visit was less than one year ago  Colon cancer screening is up to date, colonoscopy 9/2/21  His diet is healthy overall Exercise: walking

## 2023-09-05 ENCOUNTER — RX RENEWAL (OUTPATIENT)
Age: 73
End: 2023-09-05

## 2023-09-05 RX ORDER — NAPROXEN 500 MG/1
500 TABLET ORAL
Qty: 60 | Refills: 0 | Status: ACTIVE | COMMUNITY
Start: 2023-07-25 | End: 1900-01-01

## 2023-09-12 ENCOUNTER — RX RENEWAL (OUTPATIENT)
Age: 73
End: 2023-09-12

## 2023-10-09 ENCOUNTER — RX RENEWAL (OUTPATIENT)
Age: 73
End: 2023-10-09

## 2023-10-09 ENCOUNTER — APPOINTMENT (OUTPATIENT)
Dept: CARDIOLOGY | Facility: CLINIC | Age: 73
End: 2023-10-09

## 2023-10-16 ENCOUNTER — NON-APPOINTMENT (OUTPATIENT)
Age: 73
End: 2023-10-16

## 2023-10-23 ENCOUNTER — APPOINTMENT (OUTPATIENT)
Dept: CARDIOLOGY | Facility: CLINIC | Age: 73
End: 2023-10-23
Payer: MEDICARE

## 2023-10-23 ENCOUNTER — NON-APPOINTMENT (OUTPATIENT)
Age: 73
End: 2023-10-23

## 2023-10-23 VITALS
OXYGEN SATURATION: 98 % | DIASTOLIC BLOOD PRESSURE: 82 MMHG | BODY MASS INDEX: 34.25 KG/M2 | HEART RATE: 75 BPM | SYSTOLIC BLOOD PRESSURE: 140 MMHG | WEIGHT: 274 LBS

## 2023-10-23 DIAGNOSIS — I77.810 THORACIC AORTIC ECTASIA: ICD-10-CM

## 2023-10-23 DIAGNOSIS — I11.9 HYPERTENSIVE HEART DISEASE W/OUT HEART FAILURE: ICD-10-CM

## 2023-10-23 PROCEDURE — 93000 ELECTROCARDIOGRAM COMPLETE: CPT

## 2023-10-23 PROCEDURE — 99214 OFFICE O/P EST MOD 30 MIN: CPT

## 2023-10-24 LAB — PSA SERPL-MCNC: 0.31 NG/ML

## 2024-01-08 ENCOUNTER — RX RENEWAL (OUTPATIENT)
Age: 74
End: 2024-01-08

## 2024-01-08 RX ORDER — MIRTAZAPINE 7.5 MG/1
7.5 TABLET, FILM COATED ORAL
Qty: 90 | Refills: 0 | Status: ACTIVE | COMMUNITY
Start: 1900-01-01 | End: 1900-01-01

## 2024-01-17 RX ORDER — TELMISARTAN 40 MG/1
40 TABLET ORAL
Qty: 90 | Refills: 1 | Status: ACTIVE | COMMUNITY
Start: 2019-06-21 | End: 1900-01-01

## 2024-01-29 DIAGNOSIS — R05.9 COUGH, UNSPECIFIED: ICD-10-CM

## 2024-01-29 RX ORDER — BENZONATATE 200 MG/1
200 CAPSULE ORAL 3 TIMES DAILY
Qty: 30 | Refills: 2 | Status: ACTIVE | COMMUNITY
Start: 2024-01-29 | End: 1900-01-01

## 2024-01-30 RX ORDER — HYDROCODONE BITARTRATE AND HOMATROPINE METHYLBROMIDE 1.5; 5 MG/5ML; MG/5ML
5-1.5 SOLUTION ORAL
Qty: 120 | Refills: 0 | Status: ACTIVE | COMMUNITY
Start: 2024-01-29 | End: 1900-01-01

## 2024-02-26 RX ORDER — APIXABAN 5 MG/1
5 TABLET, FILM COATED ORAL
Qty: 180 | Refills: 2 | Status: ACTIVE | COMMUNITY
Start: 2018-05-15 | End: 1900-01-01

## 2024-02-26 RX ORDER — ROSUVASTATIN CALCIUM 10 MG/1
10 TABLET, FILM COATED ORAL DAILY
Qty: 90 | Refills: 1 | Status: ACTIVE | COMMUNITY
Start: 2021-06-28 | End: 1900-01-01

## 2024-05-16 NOTE — DISEASE MANAGEMENT
Problem: Alteration in Thoughts and Perception  Goal: Treatment Goal: Gain control of psychotic behaviors/thinking, reduce/eliminate presenting symptoms and demonstrate improved reality functioning upon discharge  6/11/2023 1146 by Illa Burkitt, RN  Outcome: Progressing  6/11/2023 1133 by Illa Burkitt, RN  Outcome: Progressing  Goal: Verbalize thoughts and feelings  Description: Interventions:  - Promote a nonjudgmental and trusting relationship with the patient through active listening and therapeutic communication  - Assess patient's level of functioning, behavior and potential for risk  - Engage patient in 1 on 1 interactions  - Encourage patient to express fears, feelings, frustrations, and discuss symptoms    - [place] patient to reality, help patient recognize reality-based thinking   - Administer medications as ordered and assess for potential side effects  - Provide the patient education related to the signs and symptoms of the illness and desired effects of prescribed medications  6/11/2023 1146 by Illa Burkitt, RN  Outcome: Progressing  6/11/2023 1133 by Illa Burkitt, RN  Outcome: Progressing  Goal: Refrain from acting on delusional thinking/internal stimuli  Description: Interventions:  - Monitor patient closely, per order   - Utilize least restrictive measures   - Set reasonable limits, give positive feedback for acceptable   - Administer medications as ordered and monitor of potential side effects  6/11/2023 1146 by Illa Burkitt, RN  Outcome: Progressing  6/11/2023 1133 by Illa Burkitt, RN  Outcome: Progressing  Goal: Agree to be compliant with medication regime, as prescribed and report medication side effects  Description: Interventions:  - Offer appropriate PRN medication and supervise ingestion; conduct AIMS, as needed   6/11/2023 1146 by Illa Burkitt, RN  Outcome: Progressing  6/11/2023 1133 by Illa Burkitt, RN  Outcome: Progressing  Goal: Attend and participate in unit activities, [Pathological] : TNM Stage: p [I] : I including therapeutic, recreational, and educational groups  Description: Interventions:  -Encourage Visitation and family involvement in care  6/11/2023 1146 by Padmini Harp RN  Outcome: Progressing  6/11/2023 1133 by Padmini Harp RN  Outcome: Progressing  Goal: Recognize dysfunctional thoughts, communicate reality-based thoughts at the time of discharge  Description: Interventions:  - Provide medication and psycho-education to assist patient in compliance and developing insight into his/her illness   6/11/2023 1146 by Padmini Harp RN  Outcome: Progressing  6/11/2023 1133 by Padmini Harp RN  Outcome: Progressing  Goal: Complete daily ADLs, including personal hygiene independently, as able  Description: Interventions:  - Observe, teach, and assist patient with ADLS  - Monitor and promote a balance of rest/activity, with adequate nutrition and elimination   6/11/2023 1146 by Padmini Harp RN  Outcome: Progressing  6/11/2023 1133 by Padmini Harp RN  Outcome: Progressing     Problem: Risk for Self Injury/Neglect  Goal: Treatment Goal: Remain safe during length of stay, learn and adopt new coping skills, and be free of self-injurious ideation, impulses and acts at the time of discharge  6/11/2023 1146 by Padmini Harp RN  Outcome: Progressing  6/11/2023 1133 by Padmini Harp RN  Outcome: Progressing  Goal: Verbalize thoughts and feelings  Description: Interventions:  - Assess and re-assess patient's lethality and potential for self-injury  - Engage patient in 1:1 interactions, daily, for a minimum of 15 minutes  - Encourage patient to express feelings, fears, frustrations, hopes  - Establish rapport/trust with patient   6/11/2023 1146 by Padmini Harp RN  Outcome: Progressing  6/11/2023 1133 by Padmini Harp RN  Outcome: Progressing  Goal: Refrain from harming self  Description: Interventions:  - Monitor patient closely, per order  - Develop a trusting relationship  - Supervise medication [TTNM] : 1c ingestion, monitor effects and side effects   6/11/2023 1146 by Stan Avila RN  Outcome: Progressing  6/11/2023 1133 by Stan Avila RN  Outcome: Progressing  Goal: Attend and participate in unit activities, including therapeutic, recreational, and educational groups  Description: Interventions:  - Provide therapeutic and educational activities daily, encourage attendance and participation, and document same in the medical record  - Obtain collateral information, encourage visitation and family involvement in care   6/11/2023 1146 by Stan Avila RN  Outcome: Progressing  6/11/2023 1133 by Stan Avila RN  Outcome: Progressing  Goal: Recognize maladaptive responses and adopt new coping mechanisms  6/11/2023 1146 by Stan Avila RN  Outcome: Progressing  6/11/2023 1133 by Stan Avila RN  Outcome: Progressing  Goal: Complete daily ADLs, including personal hygiene independently, as able  Description: Interventions:  - Observe, teach, and assist patient with ADLS  - Monitor and promote a balance of rest/activity, with adequate nutrition and elimination  6/11/2023 1146 by Stan Avila RN  Outcome: Progressing  6/11/2023 1133 by Stan Avila RN  Outcome: Progressing     Problem: Depression  Goal: Treatment Goal: Demonstrate behavioral control of depressive symptoms, verbalize feelings of improved mood/affect, and adopt new coping skills prior to discharge  6/11/2023 1146 by Stan Avila RN  Outcome: Progressing  6/11/2023 1133 by Stan Avila RN  Outcome: Progressing  Goal: Verbalize thoughts and feelings  Description: Interventions:  - Assess and re-assess patient's level of risk   - Engage patient in 1:1 interactions, daily, for a minimum of 15 minutes   - Encourage patient to express feelings, fears, frustrations, hopes   6/11/2023 1146 by Stan Avila RN  Outcome: Progressing  6/11/2023 1133 by Stan Avila RN  Outcome: Progressing  Goal: Refrain from harming self  Description: Interventions:  - Monitor patient closely, per order   - Supervise medication ingestion, monitor effects and side effects   6/11/2023 1146 by Katie Greco RN  Outcome: Progressing  6/11/2023 1133 by Katie Greco RN  Outcome: Progressing  Goal: Refrain from isolation  Description: Interventions:  - Develop a trusting relationship   - Encourage socialization   6/11/2023 1146 by Katie Greco RN  Outcome: Progressing  6/11/2023 1133 by Katie Greco RN  Outcome: Progressing  Goal: Refrain from self-neglect  6/11/2023 1146 by Katie Greco RN  Outcome: Progressing  6/11/2023 1133 by Katie Greco RN  Outcome: Progressing  Goal: Attend and participate in unit activities, including therapeutic, recreational, and educational groups  Description: Interventions:  - Provide therapeutic and educational activities daily, encourage attendance and participation, and document same in the medical record   6/11/2023 1146 by Katie Greco RN  Outcome: Progressing  6/11/2023 1133 by Katie Greco RN  Outcome: Progressing  Goal: Complete daily ADLs, including personal hygiene independently, as able  Description: Interventions:  - Observe, teach, and assist patient with ADLS  -  Monitor and promote a balance of rest/activity, with adequate nutrition and elimination   6/11/2023 1146 by Katie Greco RN  Outcome: Progressing  6/11/2023 1133 by Katie Greco RN  Outcome: Progressing     Problem: Anxiety  Goal: Anxiety is at manageable level  Description: Interventions:  - Assess and monitor patient's anxiety level  - Monitor for signs and symptoms (heart palpitations, chest pain, shortness of breath, headaches, nausea, feeling jumpy, restlessness, irritable, apprehensive)  - Collaborate with interdisciplinary team and initiate plan and interventions as ordered    - [place] patient to unit/surroundings  - Explain treatment plan  - Encourage participation in care  - Encourage verbalization of concerns/fears  - [NTNM] : 0 Identify coping mechanisms  - Assist in developing anxiety-reducing skills  - Administer/offer alternative therapies  - Limit or eliminate stimulants  6/11/2023 1146 by Jose Lopez RN  Outcome: Progressing  6/11/2023 1133 by Jose Lopez RN  Outcome: Progressing     Problem: Risk for Violence/Aggression Toward Others  Goal: Treatment Goal: Refrain from acts of violence/aggression during length of stay, and demonstrate improved impulse control at the time of discharge  6/11/2023 1146 by Jose Lopez RN  Outcome: Progressing  6/11/2023 1133 by Jose Lopez RN  Outcome: Progressing  Goal: Verbalize thoughts and feelings  Description: Interventions:  - Assess and re-assess patient's level of risk, every waking shift  - Engage patient in 1:1 interactions, daily, for a minimum of 15 minutes   - Allow patient to express feelings and frustrations in a safe and non-threatening manner   - Establish rapport/trust with patient   6/11/2023 1146 by Jose Lopez RN  Outcome: Progressing  6/11/2023 1133 by Jose Lopez RN  Outcome: Progressing  Goal: Refrain from harming others  6/11/2023 1146 by Jose Lopez RN  Outcome: Progressing  6/11/2023 1133 by Jose Lopez RN  Outcome: Progressing  Goal: Refrain from destructive acts on the environment or property  6/11/2023 1146 by Jose Lopez RN  Outcome: Progressing  6/11/2023 1133 by Jose Lopez RN  Outcome: Progressing  Goal: Control angry outbursts  Description: Interventions:  - Monitor patient closely, per order  - Ensure early verbal de-escalation  - Monitor prn medication needs  - Set reasonable/therapeutic limits, outline behavioral expectations, and consequences   - Provide a non-threatening milieu, utilizing the least restrictive interventions   6/11/2023 1146 by Jose Lopez RN  Outcome: Progressing  6/11/2023 1133 by Jose Lopez RN  Outcome: Progressing  Goal: Attend and participate in unit activities, including therapeutic, [MTNM] : 0 [de-identified] : [id number] recreational, and educational groups  Description: Interventions:  - Provide therapeutic and educational activities daily, encourage attendance and participation, and document same in the medical record   6/11/2023 1146 by Stan Avila RN  Outcome: Progressing  6/11/2023 1133 by Stan Avila RN  Outcome: Progressing  Goal: Identify appropriate positive anger management techniques  Description: Interventions:  - Offer anger management and coping skills groups   - Staff will provide positive feedback for appropriate anger control  6/11/2023 1146 by Stan Avila RN  Outcome: Progressing  6/11/2023 1133 by Stan Avila RN  Outcome: Progressing     Problem: Alteration in Orientation  Goal: Treatment Goal: Demonstrate a reduction of confusion and improved orientation to person, place, time and/or situation upon discharge, according to optimum baseline/ability  6/11/2023 1146 by Stan Avila RN  Outcome: Progressing  6/11/2023 1133 by Stan Avila RN  Outcome: Progressing  Goal: Interact with staff daily  Description: Interventions:  - Assess and re-assess patient's level of orientation  - Engage patient in 1 on 1 interactions, daily, for a minimum of 15 minutes   - Establish rapport/trust with patient   6/11/2023 1146 by Stan Avila RN  Outcome: Progressing  6/11/2023 1133 by Stan Avila RN  Outcome: Progressing  Goal: Express concerns related to confused thinking related to:  Description: Interventions:  - Encourage patient to express feelings, fears, frustrations, hopes  - Assign consistent caregivers   - [place]/re-orient patient as needed  - Allow comfort items, as appropriate  - Provide visual cues, signs, etc    6/11/2023 1146 by Stan Avila RN  Outcome: Progressing  6/11/2023 1133 by Stan Avila RN  Outcome: Progressing  Goal: Allow medical examinations, as recommended  Description: Interventions:  - Provide physical/neurological exams and/or referrals, per provider   6/11/2023 1146 [de-identified] : [id number] by Memo Genre, RN  Outcome: Progressing  6/11/2023 1133 by Memo Qureshi RN  Outcome: Progressing  Goal: Cooperate with recommended testing/procedures  Description: Interventions:  - Determine need for ancillary testing  - Observe for mental status changes  - Implement falls/precaution protocol   6/11/2023 1146 by Memo Qureshi RN  Outcome: Progressing  6/11/2023 1133 by Memo Qureshi RN  Outcome: Progressing  Goal: Attend and participate in unit activities, including therapeutic, recreational, and educational groups  Description: Interventions:  - Provide therapeutic and educational activities daily, encourage attendance and participation, and document same in the medical record   - Provide appropriate opportunities for reminiscence   - Provide a consistent daily routine   - Encourage family contact/visitation   6/11/2023 1146 by Memo Qureshi RN  Outcome: Progressing  6/11/2023 1133 by Memo Qureshi RN  Outcome: Progressing  Goal: Complete daily ADLs, including personal hygiene independently, as able  Description: Interventions:  - Observe, teach, and assist patient with ADLS  0/01/9038 [id number] by Memo Qureshi RN  Outcome: Progressing  6/11/2023 1133 by Memo Qureshi RN  Outcome: Progressing     Problem: Prexisting or High Potential for Compromised Skin Integrity  Goal: Skin integrity is maintained or improved  Description: INTERVENTIONS:  - Identify patients at risk for skin breakdown  - Assess and monitor skin integrity  - Assess and monitor nutrition and hydration status  - Monitor labs   - Assess for incontinence   - Turn and reposition patient  - Assist with mobility/ambulation  - Relieve pressure over bony prominences  - Avoid friction and shearing  - Provide appropriate hygiene as needed including keeping skin clean and dry  - Evaluate need for skin moisturizer/barrier cream  - Collaborate with interdisciplinary team   - Patient/family teaching  - Consider wound care consult   6/11/2023 1146 [de-identified] : Prostate/ SV by Debora Forbes, RN  Outcome: Progressing  6/11/2023 1133 by Debora Forbes, RN  Outcome: Progressing no

## 2024-05-22 NOTE — PRE-OP CHECKLIST - TEMPERATURE IN CELSIUS (DEGREES C)
TRANSFER - OUT REPORT:    Verbal report given to Kota on Ros [place]  being transferred to ICU for routine progression of patient care       Report consisted of patient's Situation, Background, Assessment and   Recommendations(SBAR).     Information from the following report(s) Index, ED Encounter Summary, ED SBAR, Adult Overview, MAR, Recent Results, and Med Rec Status was reviewed with the receiving nurse.    [place] Fall Assessment:                           Lines:   Peripheral IV 05/22/24 Right Antecubital (Active)   Site Assessment Clean, dry & intact 05/22/24 1244   Phlebitis Assessment No symptoms 05/22/24 1244   Infiltration Assessment 0 05/22/24 1244       Peripheral IV 05/22/24 Right Antecubital (Active)   Site Assessment Clean, dry & intact 05/22/24 1254   Line Status Blood return noted 05/22/24 1254   Phlebitis Assessment No symptoms 05/22/24 1254   Infiltration Assessment 0 05/22/24 1254   Dressing Status Clean, dry & intact 05/22/24 1254   Dressing Type Transparent 05/22/24 1254       Triple Lumen Hemodialysis Catheter (Trialysis) Left Neck (Active)       Triple Lumen Hemodialysis Catheter (Trialysis) Neck (Active)        Opportunity for questions and clarification was provided.      Patient transported with:  RN and tech from ICU         36.3

## 2024-05-29 ENCOUNTER — RX RENEWAL (OUTPATIENT)
Age: 74
End: 2024-05-29

## 2024-05-29 RX ORDER — ALLOPURINOL 100 MG/1
100 TABLET ORAL
Qty: 90 | Refills: 0 | Status: ACTIVE | COMMUNITY
Start: 2023-08-29 | End: 1900-01-01

## 2024-08-05 LAB — PSA SERPL-MCNC: 0.14 NG/ML

## 2024-08-06 ENCOUNTER — NON-APPOINTMENT (OUTPATIENT)
Age: 74
End: 2024-08-06

## 2024-08-07 ENCOUNTER — APPOINTMENT (OUTPATIENT)
Dept: RADIATION ONCOLOGY | Facility: CLINIC | Age: 74
End: 2024-08-07

## 2024-08-07 PROCEDURE — 99213 OFFICE O/P EST LOW 20 MIN: CPT

## 2024-08-07 PROCEDURE — 99203 OFFICE O/P NEW LOW 30 MIN: CPT

## 2024-08-07 NOTE — DISEASE MANAGEMENT
[1] : T1 [c] : c [0] : M0 [Biopsy] : Patient had a biopsy on [7(3+4)] : Template Biopsy [place] Score: 7(3+4) [] : Patient had a Prostate MRI [Pathological] : TNM Stage: p [I] : I [BiopsyDate] : 07/21 [TotalCores] : 13 [TotalPositiveCores] : 4 [TTNM] : 1 [NTNM] : 0 [MTNM] : 0

## 2024-08-07 NOTE — DISEASE MANAGEMENT
[1] : T1 [c] : c [0] : M0 [Biopsy] : Patient had a biopsy on [7(3+4)] : Template Biopsy [place] Score: 7(3+4) [] : Patient had a Prostate MRI [Pathological] : TNM Stage: p [I] : I [BiopsyDate] : 07/21 [TotalCores] : 13 [TotalPositiveCores] : 4 [TTNM] : 1 [NTNM] : 0 [MTNM] : 0

## 2024-08-07 NOTE — HISTORY OF PRESENT ILLNESS
[FreeTextEntry1] : The patient is a 74 year old male treated for prostate cancer.  He first presented to Dr. Lorenzana in 2021 with an elevated PSA of 4.99 ng/ml. Repeat PSA on 21 was 5.21 ng/ml. Prostate MRI performed on 21 showed a lesion right, anterior base to apex, transition zone measuring 23 x 17 x 22 mm. No extraprostatic extension seen. No seminal vesicle invasion seen. No pelvic adenopathy seen. Prostate biopsy performed by Dr. Jaime on 21 showed adenocarcinoma in 4/13 cores. Mark score of  3+3=6 in 2 cores and 3+4=7 in 2 cores. Maximum involvement 90% of the core. CT A/P on 21 showed no evidence of metastatic disease. Bone scan 21 was negative for metastatic disease. He reports nocturia x 1 and denies any bowel problems. He has not had a colonoscopy for the past 5 years. He is not sexually active. He has a seizure disorder and last seizure in May 2021 at which time he switched from Keppra to Lamictal.    He completed a dose of 7000 cGy to the prostate/SV on 21.   PSA  [email or web address]= 5.21 10/14/2022= 0.42 23 = 0.31 24= 0.14  He presents for 1 year 9 months follow up.  Patient reports nocturia x 1 at 2am reliably with some degree of urgency during a day. Denies hematuria, He plays golf regularly, c/o getting tired more quickly than in the past. PSA drawn today. Reports chronic constipation -advised to try ground flaxseeds daily. Plans to return to [place], [place] for most of year.  24 Patient presents for 2 year 9 months f/u. PSA 24 was 0.14. He moved to [place], [place] and developed CP and was diagnosed with 3 vessel disease and underwent a quadruple bypass recently in [place], 3/26/24. He sustained injury to the urethra due to Phillips cath being pulled while on narcotics. Nocturia 0-1. During the day he has urgency and frequency. Denies rectal pain or bleeding. Poor erectile function. He is eating more plant based and exercising more.

## 2024-08-07 NOTE — HISTORY OF PRESENT ILLNESS
[FreeTextEntry1] : The patient is a 74 year old male treated for prostate cancer.  He first presented to Dr. Lorenzana in 2021 with an elevated PSA of 4.99 ng/ml. Repeat PSA on 21 was 5.21 ng/ml. Prostate MRI performed on 21 showed a lesion right, anterior base to apex, transition zone measuring 23 x 17 x 22 mm. No extraprostatic extension seen. No seminal vesicle invasion seen. No pelvic adenopathy seen. Prostate biopsy performed by Dr. Jaime on 21 showed adenocarcinoma in 4/13 cores. Mark score of  3+3=6 in 2 cores and 3+4=7 in 2 cores. Maximum involvement 90% of the core. CT A/P on 21 showed no evidence of metastatic disease. Bone scan 21 was negative for metastatic disease. He reports nocturia x 1 and denies any bowel problems. He has not had a colonoscopy for the past 5 years. He is not sexually active. He has a seizure disorder and last seizure in May 2021 at which time he switched from Keppra to Lamictal.    He completed a dose of 7000 cGy to the prostate/SV on 21.   PSA  [email or web address]= 5.21 10/14/2022= 0.42 23 = 0.31 24= 0.14  He presents for 1 year 9 months follow up.  Patient reports nocturia x 1 at 2am reliably with some degree of urgency during a day. Denies hematuria, He plays golf regularly, c/o getting tired more quickly than in the past. PSA drawn today. Reports chronic constipation -advised to try ground flaxseeds daily. Plans to return to [place], [place] for most of year.  24 Patient presents for 2 year 9 months f/u. PSA 24 was 0.14. He moved to [place], [place] and developed CP and was diagnosed with 3 vessel disease and underwent a quadruple bypass recently in [place], 3/26/24. He sustained injury to the urethra due to Phillips cath being pulled while on narcotics. Nocturia 0-1. During the day he has urgency and frequency. Denies rectal pain or bleeding. Poor erectile function. He is eating more plant based and exercising more.

## 2024-08-28 ENCOUNTER — RX RENEWAL (OUTPATIENT)
Age: 74
End: 2024-08-28

## 2024-11-26 ENCOUNTER — RX RENEWAL (OUTPATIENT)
Age: 74
End: 2024-11-26